# Patient Record
Sex: MALE | Race: ASIAN
[De-identification: names, ages, dates, MRNs, and addresses within clinical notes are randomized per-mention and may not be internally consistent; named-entity substitution may affect disease eponyms.]

---

## 2021-06-02 PROBLEM — Z00.00 ENCOUNTER FOR PREVENTIVE HEALTH EXAMINATION: Status: ACTIVE | Noted: 2021-06-02

## 2021-06-07 ENCOUNTER — APPOINTMENT (OUTPATIENT)
Dept: CARDIOLOGY | Facility: CLINIC | Age: 66
End: 2021-06-07
Payer: MEDICAID

## 2021-06-07 VITALS
WEIGHT: 187.9 LBS | RESPIRATION RATE: 18 BRPM | HEIGHT: 66.93 IN | OXYGEN SATURATION: 97 % | BODY MASS INDEX: 29.49 KG/M2 | DIASTOLIC BLOOD PRESSURE: 88 MMHG | TEMPERATURE: 97.6 F | HEART RATE: 81 BPM | SYSTOLIC BLOOD PRESSURE: 143 MMHG

## 2021-06-07 DIAGNOSIS — I10 ESSENTIAL (PRIMARY) HYPERTENSION: ICD-10-CM

## 2021-06-07 DIAGNOSIS — I63.9 CEREBRAL INFARCTION, UNSPECIFIED: ICD-10-CM

## 2021-06-07 DIAGNOSIS — Z82.49 FAMILY HISTORY OF ISCHEMIC HEART DISEASE AND OTHER DISEASES OF THE CIRCULATORY SYSTEM: ICD-10-CM

## 2021-06-07 DIAGNOSIS — Z87.891 PERSONAL HISTORY OF NICOTINE DEPENDENCE: ICD-10-CM

## 2021-06-07 PROCEDURE — 99204 OFFICE O/P NEW MOD 45 MIN: CPT

## 2021-06-07 PROCEDURE — 93000 ELECTROCARDIOGRAM COMPLETE: CPT

## 2021-06-07 RX ORDER — ATORVASTATIN CALCIUM 20 MG/1
20 TABLET, FILM COATED ORAL
Refills: 0 | Status: ACTIVE | COMMUNITY

## 2021-06-07 RX ORDER — VALSARTAN 160 MG/1
160 TABLET ORAL
Refills: 0 | Status: ACTIVE | COMMUNITY
Start: 2021-06-07

## 2021-06-07 RX ORDER — FOLIC ACID 1 MG/1
1 TABLET ORAL
Refills: 0 | Status: ACTIVE | COMMUNITY

## 2021-06-07 RX ORDER — ASPIRIN 81 MG
81 TABLET, DELAYED RELEASE (ENTERIC COATED) ORAL
Refills: 0 | Status: ACTIVE | COMMUNITY

## 2021-06-07 NOTE — PHYSICAL EXAM
[Well Developed] : well developed [Well Nourished] : well nourished [No Acute Distress] : no acute distress [Normal Conjunctiva] : normal conjunctiva [Normal Venous Pressure] : normal venous pressure [No Carotid Bruit] : no carotid bruit [Normal S1, S2] : normal S1, S2 [No Rub] : no rub [No Gallop] : no gallop [Clear Lung Fields] : clear lung fields [Good Air Entry] : good air entry [No Respiratory Distress] : no respiratory distress  [Soft] : abdomen soft [Non Tender] : non-tender [No Masses/organomegaly] : no masses/organomegaly [Normal Bowel Sounds] : normal bowel sounds [Normal Gait] : normal gait [No Edema] : no edema [No Cyanosis] : no cyanosis [No Clubbing] : no clubbing [No Varicosities] : no varicosities [No Rash] : no rash [No Skin Lesions] : no skin lesions [Moves all extremities] : moves all extremities [No Focal Deficits] : no focal deficits [Normal Speech] : normal speech [Alert and Oriented] : alert and oriented [Normal memory] : normal memory [de-identified] : 2/6 ZACHARY MERLOS

## 2021-06-07 NOTE — HISTORY OF PRESENT ILLNESS
[FreeTextEntry1] : 1. HTN: on medications.\par 2. Hyperlipidemia: on statin.\par 3. CP/ SOB: patient was recently admitted from 4/28 to 5/12/2021 with CVA and respiratory failure at [facility] due to\par COVID.\par Patient has noted severe exertional SOB over the last several weeks. His symptoms are exertional, progressive and worsening over the last few weeks. He also has intermittent CP without radiation.\par His ET is severely limited. \par

## 2021-06-07 NOTE — REASON FOR VISIT
[Hyperlipidemia] : hyperlipidemia [Hypertension] : hypertension [FreeTextEntry1] : 65 M HTN hyperlipidemia recent CVA with CP and SOB.

## 2021-06-07 NOTE — DISCUSSION/SUMMARY
[FreeTextEntry1] : 65 M HTN hyperlipidemia CVA with CP and SOB.\par CHECK NST TO ASSESS PERFUSION (limited ET, recent CVA).\par Continue medications for HTN and hyperlipidemia.\par [facility] echo and records reviewed.\par Continue ASA.

## 2021-06-30 ENCOUNTER — APPOINTMENT (OUTPATIENT)
Dept: CARDIOLOGY | Facility: CLINIC | Age: 66
End: 2021-06-30
Payer: MEDICAID

## 2021-06-30 DIAGNOSIS — R06.02 SHORTNESS OF BREATH: ICD-10-CM

## 2021-06-30 PROCEDURE — 93015 CV STRESS TEST SUPVJ I&R: CPT

## 2021-06-30 PROCEDURE — A9500: CPT

## 2021-06-30 PROCEDURE — 78452 HT MUSCLE IMAGE SPECT MULT: CPT

## 2025-04-18 ENCOUNTER — INPATIENT (INPATIENT)
Facility: HOSPITAL | Age: 70
LOS: 2 days | Discharge: HOME | DRG: 310 | End: 2025-04-21
Attending: INTERNAL MEDICINE | Admitting: INTERNAL MEDICINE
Payer: COMMERCIAL

## 2025-04-18 VITALS
TEMPERATURE: 98 F | RESPIRATION RATE: 18 BRPM | DIASTOLIC BLOOD PRESSURE: 89 MMHG | HEART RATE: 56 BPM | WEIGHT: 195.11 LBS | HEIGHT: 68 IN | SYSTOLIC BLOOD PRESSURE: 162 MMHG | OXYGEN SATURATION: 98 %

## 2025-04-18 DIAGNOSIS — R00.1 BRADYCARDIA, UNSPECIFIED: ICD-10-CM

## 2025-04-18 DIAGNOSIS — B36.9 SUPERFICIAL MYCOSIS, UNSPECIFIED: ICD-10-CM

## 2025-04-18 LAB
ADD ON TEST-SPECIMEN IN LAB: SIGNIFICANT CHANGE UP
ALBUMIN SERPL ELPH-MCNC: 4.2 G/DL — SIGNIFICANT CHANGE UP (ref 3.3–5)
ALP SERPL-CCNC: 126 U/L — HIGH (ref 40–120)
ALT FLD-CCNC: 10 U/L — SIGNIFICANT CHANGE UP (ref 10–45)
ANION GAP SERPL CALC-SCNC: 13 MMOL/L — SIGNIFICANT CHANGE UP (ref 5–17)
AST SERPL-CCNC: 26 U/L — SIGNIFICANT CHANGE UP (ref 10–40)
BASOPHILS # BLD AUTO: 0.06 K/UL — SIGNIFICANT CHANGE UP (ref 0–0.2)
BASOPHILS NFR BLD AUTO: 0.7 % — SIGNIFICANT CHANGE UP (ref 0–2)
BILIRUB SERPL-MCNC: 0.6 MG/DL — SIGNIFICANT CHANGE UP (ref 0.2–1.2)
BUN SERPL-MCNC: 16 MG/DL — SIGNIFICANT CHANGE UP (ref 7–23)
CALCIUM SERPL-MCNC: 9.2 MG/DL — SIGNIFICANT CHANGE UP (ref 8.4–10.5)
CHLORIDE SERPL-SCNC: 107 MMOL/L — SIGNIFICANT CHANGE UP (ref 96–108)
CO2 SERPL-SCNC: 21 MMOL/L — LOW (ref 22–31)
CREAT SERPL-MCNC: 1.31 MG/DL — HIGH (ref 0.5–1.3)
EGFR: 60 ML/MIN/1.73M2 — SIGNIFICANT CHANGE UP
EGFR: 60 ML/MIN/1.73M2 — SIGNIFICANT CHANGE UP
EOSINOPHIL # BLD AUTO: 0.48 K/UL — SIGNIFICANT CHANGE UP (ref 0–0.5)
EOSINOPHIL NFR BLD AUTO: 5.3 % — SIGNIFICANT CHANGE UP (ref 0–6)
GLUCOSE BLDC GLUCOMTR-MCNC: 108 MG/DL — HIGH (ref 70–99)
GLUCOSE BLDC GLUCOMTR-MCNC: 137 MG/DL — HIGH (ref 70–99)
GLUCOSE BLDC GLUCOMTR-MCNC: 158 MG/DL — HIGH (ref 70–99)
GLUCOSE SERPL-MCNC: 125 MG/DL — HIGH (ref 70–99)
GRAM STN FLD: SIGNIFICANT CHANGE UP
HCT VFR BLD CALC: 46.5 % — SIGNIFICANT CHANGE UP (ref 39–50)
HGB BLD-MCNC: 15.9 G/DL — SIGNIFICANT CHANGE UP (ref 13–17)
IMM GRANULOCYTES NFR BLD AUTO: 0.4 % — SIGNIFICANT CHANGE UP (ref 0–0.9)
LYMPHOCYTES # BLD AUTO: 2.37 K/UL — SIGNIFICANT CHANGE UP (ref 1–3.3)
LYMPHOCYTES # BLD AUTO: 26.1 % — SIGNIFICANT CHANGE UP (ref 13–44)
MCHC RBC-ENTMCNC: 30.4 PG — SIGNIFICANT CHANGE UP (ref 27–34)
MCHC RBC-ENTMCNC: 34.2 G/DL — SIGNIFICANT CHANGE UP (ref 32–36)
MCV RBC AUTO: 88.9 FL — SIGNIFICANT CHANGE UP (ref 80–100)
MONOCYTES # BLD AUTO: 0.62 K/UL — SIGNIFICANT CHANGE UP (ref 0–0.9)
MONOCYTES NFR BLD AUTO: 6.8 % — SIGNIFICANT CHANGE UP (ref 2–14)
NEUTROPHILS # BLD AUTO: 5.51 K/UL — SIGNIFICANT CHANGE UP (ref 1.8–7.4)
NEUTROPHILS NFR BLD AUTO: 60.7 % — SIGNIFICANT CHANGE UP (ref 43–77)
NRBC BLD AUTO-RTO: 0 /100 WBCS — SIGNIFICANT CHANGE UP (ref 0–0)
PLATELET # BLD AUTO: 168 K/UL — SIGNIFICANT CHANGE UP (ref 150–400)
POTASSIUM SERPL-MCNC: 3.9 MMOL/L — SIGNIFICANT CHANGE UP (ref 3.5–5.3)
POTASSIUM SERPL-SCNC: 3.9 MMOL/L — SIGNIFICANT CHANGE UP (ref 3.5–5.3)
PROT SERPL-MCNC: 7.5 G/DL — SIGNIFICANT CHANGE UP (ref 6–8.3)
RBC # BLD: 5.23 M/UL — SIGNIFICANT CHANGE UP (ref 4.2–5.8)
RBC # FLD: 13.1 % — SIGNIFICANT CHANGE UP (ref 10.3–14.5)
SODIUM SERPL-SCNC: 141 MMOL/L — SIGNIFICANT CHANGE UP (ref 135–145)
SPECIMEN SOURCE: SIGNIFICANT CHANGE UP
TROPONIN T, HIGH SENSITIVITY RESULT: 18 NG/L — SIGNIFICANT CHANGE UP (ref 0–51)
TSH SERPL-MCNC: 3.8 UIU/ML — SIGNIFICANT CHANGE UP (ref 0.27–4.2)
WBC # BLD: 9.08 K/UL — SIGNIFICANT CHANGE UP (ref 3.8–10.5)
WBC # FLD AUTO: 9.08 K/UL — SIGNIFICANT CHANGE UP (ref 3.8–10.5)

## 2025-04-18 PROCEDURE — 69200 CLEAR OUTER EAR CANAL: CPT | Mod: RT

## 2025-04-18 PROCEDURE — 70481 CT ORBIT/EAR/FOSSA W/DYE: CPT | Mod: 26,XU

## 2025-04-18 PROCEDURE — 71045 X-RAY EXAM CHEST 1 VIEW: CPT | Mod: 26

## 2025-04-18 PROCEDURE — 93010 ELECTROCARDIOGRAM REPORT: CPT

## 2025-04-18 PROCEDURE — 99285 EMERGENCY DEPT VISIT HI MDM: CPT | Mod: 25

## 2025-04-18 PROCEDURE — G0545: CPT

## 2025-04-18 PROCEDURE — 99222 1ST HOSP IP/OBS MODERATE 55: CPT | Mod: 25

## 2025-04-18 PROCEDURE — 70450 CT HEAD/BRAIN W/O DYE: CPT | Mod: 26

## 2025-04-18 PROCEDURE — 99222 1ST HOSP IP/OBS MODERATE 55: CPT

## 2025-04-18 RX ORDER — POSACONAZOLE 100 MG/1
300 TABLET, DELAYED RELEASE ORAL EVERY 12 HOURS
Refills: 0 | Status: COMPLETED | OUTPATIENT
Start: 2025-04-18 | End: 2025-04-19

## 2025-04-18 RX ORDER — POSACONAZOLE 100 MG/1
300 TABLET, DELAYED RELEASE ORAL EVERY 24 HOURS
Refills: 0 | Status: DISCONTINUED | OUTPATIENT
Start: 2025-04-20 | End: 2025-04-21

## 2025-04-18 RX ORDER — METFORMIN HYDROCHLORIDE 850 MG/1
1 TABLET ORAL
Refills: 0 | DISCHARGE

## 2025-04-18 RX ORDER — ACETIC ACID
5 SOLUTION, NON-ORAL VAGINAL EVERY 12 HOURS
Refills: 0 | Status: DISCONTINUED | OUTPATIENT
Start: 2025-04-18 | End: 2025-04-18

## 2025-04-18 RX ORDER — ACETIC ACID 2 %
5 SOLUTION, NON-ORAL OTIC (EAR)
Refills: 0 | Status: DISCONTINUED | OUTPATIENT
Start: 2025-04-18 | End: 2025-04-21

## 2025-04-18 RX ORDER — ACETAMINOPHEN 500 MG/5ML
1000 LIQUID (ML) ORAL ONCE
Refills: 0 | Status: COMPLETED | OUTPATIENT
Start: 2025-04-18 | End: 2025-04-18

## 2025-04-18 RX ORDER — VORICONAZOLE 200 MG/1
200 TABLET, FILM COATED ORAL EVERY 12 HOURS
Refills: 0 | Status: DISCONTINUED | OUTPATIENT
Start: 2025-04-18 | End: 2025-04-18

## 2025-04-18 RX ORDER — METOCLOPRAMIDE HCL 10 MG
10 TABLET ORAL ONCE
Refills: 0 | Status: COMPLETED | OUTPATIENT
Start: 2025-04-18 | End: 2025-04-18

## 2025-04-18 RX ORDER — POSACONAZOLE 100 MG/1
TABLET, DELAYED RELEASE ORAL
Refills: 0 | Status: DISCONTINUED | OUTPATIENT
Start: 2025-04-18 | End: 2025-04-21

## 2025-04-18 RX ORDER — ATORVASTATIN CALCIUM 80 MG/1
1 TABLET, FILM COATED ORAL
Refills: 0 | DISCHARGE

## 2025-04-18 RX ORDER — AMLODIPINE BESYLATE 10 MG/1
1 TABLET ORAL
Refills: 0 | DISCHARGE

## 2025-04-18 RX ORDER — ASPIRIN 325 MG
1 TABLET ORAL
Refills: 0 | DISCHARGE

## 2025-04-18 RX ORDER — CEFEPIME 2 G/20ML
2000 INJECTION, POWDER, FOR SOLUTION INTRAVENOUS ONCE
Refills: 0 | Status: COMPLETED | OUTPATIENT
Start: 2025-04-18 | End: 2025-04-18

## 2025-04-18 RX ORDER — CEFEPIME 2 G/20ML
INJECTION, POWDER, FOR SOLUTION INTRAVENOUS
Refills: 0 | Status: DISCONTINUED | OUTPATIENT
Start: 2025-04-18 | End: 2025-04-21

## 2025-04-18 RX ORDER — INFLUENZA A VIRUS A/IDAHO/07/2018 (H1N1) ANTIGEN (MDCK CELL DERIVED, PROPIOLACTONE INACTIVATED, INFLUENZA A VIRUS A/INDIANA/08/2018 (H3N2) ANTIGEN (MDCK CELL DERIVED, PROPIOLACTONE INACTIVATED), INFLUENZA B VIRUS B/SINGAPORE/INFTT-16-0610/2016 ANTIGEN (MDCK CELL DERIVED, PROPIOLACTONE INACTIVATED), INFLUENZA B VIRUS B/IOWA/06/2017 ANTIGEN (MDCK CELL DERIVED, PROPIOLACTONE INACTIVATED) 15; 15; 15; 15 UG/.5ML; UG/.5ML; UG/.5ML; UG/.5ML
0.5 INJECTION, SUSPENSION INTRAMUSCULAR ONCE
Refills: 0 | Status: DISCONTINUED | OUTPATIENT
Start: 2025-04-18 | End: 2025-04-21

## 2025-04-18 RX ORDER — CLOPIDOGREL BISULFATE 75 MG/1
1 TABLET, FILM COATED ORAL
Refills: 0 | DISCHARGE

## 2025-04-18 RX ORDER — CEFEPIME 2 G/20ML
2000 INJECTION, POWDER, FOR SOLUTION INTRAVENOUS EVERY 12 HOURS
Refills: 0 | Status: DISCONTINUED | OUTPATIENT
Start: 2025-04-19 | End: 2025-04-21

## 2025-04-18 RX ORDER — FOLIC ACID 1 MG/1
1 TABLET ORAL
Refills: 0 | DISCHARGE

## 2025-04-18 RX ORDER — TENOFOVIR DISOPROXIL FUMARATE 300 MG/1
1 TABLET, FILM COATED ORAL
Refills: 0 | DISCHARGE

## 2025-04-18 RX ORDER — CIPROFLOXACIN HCL 250 MG
400 TABLET ORAL EVERY 12 HOURS
Refills: 0 | Status: DISCONTINUED | OUTPATIENT
Start: 2025-04-18 | End: 2025-04-18

## 2025-04-18 RX ADMIN — CEFEPIME 100 MILLIGRAM(S): 2 INJECTION, POWDER, FOR SOLUTION INTRAVENOUS at 16:27

## 2025-04-18 RX ADMIN — Medication 10 MILLIGRAM(S): at 02:27

## 2025-04-18 RX ADMIN — Medication 4 DROP(S): at 05:58

## 2025-04-18 RX ADMIN — Medication 400 MILLIGRAM(S): at 06:27

## 2025-04-18 RX ADMIN — Medication 5 DROP(S): at 17:33

## 2025-04-18 RX ADMIN — POSACONAZOLE 300 MILLIGRAM(S): 100 TABLET, DELAYED RELEASE ORAL at 17:34

## 2025-04-18 RX ADMIN — Medication 1000 MILLIGRAM(S): at 06:50

## 2025-04-18 NOTE — PHARMACOTHERAPY INTERVENTION NOTE - COMMENTS
Performed medication reconciliation and home medication list updated in prescription writer/outpatient medication review. Medications verified with patient's wife at bedside and pharmacy.     Home Pharmacy: [facility]  Allergies: None, confirmed    Home Medications:  amLODIPine 5 mg oral tablet: 1 tab(s) orally once a day  aspirin 81 mg oral delayed release tablet: 1 tab(s) orally once a day  atorvastatin 20 mg oral tablet: 1 tab(s) orally once a day (at bedtime)  clopidogrel 75 mg oral tablet: 1 tab(s) orally once a day  folic acid 1 mg oral tablet: 1 tab(s) orally once a day  metFORMIN 500 mg oral tablet, extended release: 1 tab(s) orally 2 times a day  tenofovir disoproxil fumarate 300 mg oral tablet: 1 tab(s) orally once a day  valsartan 160 mg oral tablet: 1 tab(s) orally once a day    Micah (Dutch Santo) Duran - PharmD, BCPS  Transitions of Care Pharmacist  Available on [employer] Teams (Preferred)  Performed medication reconciliation and home medication list updated in prescription writer/outpatient medication review. Medications verified with patient's wife at bedside and pharmacy.     Home Pharmacy: [facility]  Allergies: None, confirmed    Home Medications:  amLODIPine 5 mg oral tablet: 1 tab(s) orally once a day  aspirin 81 mg oral delayed release tablet: 1 tab(s) orally once a day  atorvastatin 20 mg oral tablet: 1 tab(s) orally once a day (at bedtime)  clopidogrel 75 mg oral tablet: 1 tab(s) orally once a day  folic acid 1 mg oral tablet: 1 tab(s) orally once a day  metFORMIN 500 mg oral tablet, extended release: 1 tab(s) orally once a day  tenofovir disoproxil fumarate 300 mg oral tablet: 1 tab(s) orally once a day  valsartan 160 mg oral tablet: 1 tab(s) orally once a day    Micah (Dutch Santo) Duran - PharmD, BCPS  Transitions of Care Pharmacist  Available on [employer] Teams (Preferred)

## 2025-04-18 NOTE — ED PROVIDER NOTE - ATTENDING CONTRIBUTION TO CARE
Attending MD Le:  I have seen and examined this patient and fully participated in the care of this patient as the teaching attending. I personally made/approved the management plan and take responsibility for the patient management.    Patient presents to the emergency department with right-sided headache that began approximately 6 hours prior to arrival. The headache developed gradually and is described as severe. Patient reports the pain is located on the right side of the head. Patient also reports right ear pain that began 2 days ago. Patient denies nausea, vomiting, fever, or chills. Patient reports the right ear feels "numb" and "blocked." Patient took Tylenol approximately 3-4 hours prior to arrival (around 10:00) for pain relief but continued to have symptoms. Patient was unable to sleep due to the pain. Patient denies any prior episodes of similar headache. Patient denies any new changes in vision or new numbness/tingling in extremities.    Past Medical History (may not be comprehensive): Stroke (after first COVID vaccine), Hepatitis (unspecified type)    Allergies: No known medication allergies    Medication History (may not be comprehensive): Valsartan, Amlodipine, Folic acid, Tenofovir (for hepatitis/liver), Clopidogrel    Social History: Not provided    Review of Systems:  - Constitutional symptoms: Denies fever, chills  - Eyes: Denies changes in vision  - Ears, Nose, Mouth, Throat: Right ear pain for 2 days, sensation of ear being blocked  - Cardiovascular: Not provided  - Respiratory: Not provided  - Gastrointestinal: Denies nausea, vomiting  - Neurological: Right-sided headache for 6 hours, history of stroke with previous right-sided weakness, denies new numbness or tingling in extremities    Patient's vital signs are nonactionable.  He is sitting in the stretcher in no apparent acute distress.  Moves all extremities spontaneously. Symmetric  strength bilateral upper extremities, elbow flexion 5/5 bilaterally, elbow extension 5/5 bilaterally, patient can straight leg raise bilaterally and resist symmetrically. Sensation grossly intact to light tough in bilateral face, arms and legs. Symmetric smile, EOMI b/l. PERRL b/l.  No temporal tenderness bilaterally.  Right external ear is without obvious swelling, no mastoid tenderness on the right, no pain with manipulation of the pinna or tragus.  Right tympanic membrane is unable to be visualized due to cerumen impaction.  Left panic membrane is visualized and does not show any bulging or erythema.    Patient is presenting for gradual onset right-sided headache pain for about 6 hours also in the setting of 2 days of right ear pain.  Patient is a history of CVA in the past, grossly nonfocal neurologic exam, right ear tympanic membrane is unable to be visualized however no evidence clinically of otitis externa or mastoiditis.  Given age and history of CVA in the past will obtain CT head to assess for IPH or mass lesion, overall fairly low suspicion for both however given reassuring neurologic exam.    *The above represents an initial assessment/impression. Please refer to progress notes for potential changes in patient clinical course*

## 2025-04-18 NOTE — ED PROVIDER NOTE - PHYSICAL EXAMINATION
Const: Awake, alert, no acute distress.  Appears well.  Moving comfortably on stretcher.  HEENT: NC/AT.  Moist mucous membranes. No R external ear TTP, no pain with tugging of ear, no swelling posterior to the ear, no discharge noted. L ear wnl.   Eyes: Extraocular movements intact b/l.  PERRL. No scleral icterus.  Neck: Full ROM without pain. Supple.  Cardiac: Regular rate and regular rhythm. S1 S2 present.   Resp: No evidence of respiratory distress.  No stridor or wheeze.  Good air entry b/l, breath sounds clear to auscultation b/l.  Abd: Non-distended. Soft, nontender  Skin: Normal coloration.  No rashes, abrasions or lacerations.  Neuro: Awake, alert & oriented x 3.  Moves all extremities symmetrically.  No obvious focal deficits. 5/5 strength to b/l UE and LEs (from hip), sensation intact throughout. No facial asymmetry. No change in speech per wife.

## 2025-04-18 NOTE — ED PROVIDER NOTE - CLINICAL SUMMARY MEDICAL DECISION MAKING FREE TEXT BOX
69-year-old male with PMHx HTN, HLD, prediabetes, right sided stroke presenting with right ear pain, R sided headache x 2 days. Symptoms started after pts wife tried to remove wax from R ear with a "wax removing stick." VS: ana paula, HTN. PE: benign  DDx includes but is not limited to ICH, migraine, pain 2/2 trauma to ear, otitis media  Work up: basic labs, CT head, EKG   Tx: reglan  Plan: if work up wnl and pt remains stable will dc for neurology f/u 69-year-old male with PMHx HTN, HLD, prediabetes, right sided stroke presenting with right ear pain, R sided headache x 2 days. Symptoms started after pts wife tried to remove wax from R ear with a "wax removing stick." VS: ana paula, HTN. PE: benign  DDx includes but is not limited to ICH, migraine, pain 2/2 trauma to ear, otitis media/externa  Work up: basic labs, CT head, EKG   Tx: reglan  Plan: if work up wnl and pt remains stable will dc for neurology f/u

## 2025-04-18 NOTE — H&P ADULT - HISTORY OF PRESENT ILLNESS
69-year-old male with PMHx HTN, HLD, prediabetes, right sided stroke presenting with right ear pain, headache x 2 days.  69-year-old male with pmhx htn, hld, prediabetes, right sided stroke presenting with right ear pain, headache x 2 days.    Patient's wife used a "wax removing stick" to clean wax out of patient's right ear on 4/15/2025.  Noticed some scant bloody discharge during the cleaning.  Later that night patient had ear pain.  Ear pain resolved by the next day but patient started experiencing headache which persisted into today. R sided, slow in onset, severe. Has tried Tylenol, Motrin, NyQuil without significant relief.  Had difficulty sleeping last night 2/2 HA.  Denies any further discharge from right ear.  Denies fever, CP, SOB, abd pain, n/v, d/c, numbness, tingling, paresthesia, weakness. No falls or trauma.

## 2025-04-18 NOTE — ED PROVIDER NOTE - OBJECTIVE STATEMENT
69-year-old male with PMHx HTN, HLD, prediabetes, right sided stroke presenting with right ear pain, headache x 2 days.  Patient's wife used a "wax removing stick" to clean wax out of patient's right ear on 4/15/2025.  Noticed some scant bloody discharge during the cleaning.  Later that night patient had ear pain.  Ear pain resolved by the next day but patient started experiencing headache which persisted into today. R sided, slow in onset, severe. Has tried Tylenol, Motrin, NyQuil without significant relief.  Had difficulty sleeping last night 2/2 HA.  Denies any further discharge from right ear.  Denies fever, CP, SOB, abd pain, n/v, d/c, numbness, tingling, paresthesia, weakness. No falls or trauma.

## 2025-04-18 NOTE — CONSULT NOTE ADULT - ASSESSMENT
69-year-old male with PMHx HTN, HLD, prediabetes, right sided stroke presenting with right ear pain, headache x 2 days.  Patient's wife used a "wax removing stick" to clean wax out of patient's right ear on 4/15/2025.  Noticed some scant bloody discharge during the cleaning followed by pain. On exam, wick removed from right EAC, ear canal w/ white and black debris which was suctioned and fungal / bacterial cultures obtained, unable to fully visualize TM.

## 2025-04-18 NOTE — ED PROVIDER NOTE - NSFOLLOWUPINSTRUCTIONS_ED_ALL_ED_FT
You were seen in the ED for ear pain and headache. You had blood work and imaging done here today. The results are attached within this packet, please bring it with you when you follow up with your PCP and/ neurologist in the next 2-3 days.  If you have issues obtaining follow up, please call: [phone] to obtain a doctor or specialist who takes your insurance in your area.    A migraine headache is a very strong throbbing pain on one side or both sides of your head. This type of headache can also cause other symptoms. It can last from 4 hours to 3 days. Talk with your doctor about what things may bring on (trigger) this condition.    What are the causes?  The exact cause of this condition is not known. This condition may be triggered or caused by:  •Drinking alcohol.  •Smoking.    •Taking medicines, such as:  •Medicine used to treat chest pain (nitroglycerin).  •Birth control pills.  •Estrogen.  •Some blood pressure medicines.  •Eating or drinking certain products.  •Doing physical activity.    Other things that may trigger a migraine headache include:  •Having a menstrual period.  •Pregnancy.  •Hunger.  •Stress.  •Not getting enough sleep or getting too much sleep  •Weather changes.  •Tiredness (fatigue).      What increases the risk?  •Being 25–55 years old.  •Being female.  •Having a family history of migraine headaches.  •Being .  •Having depression or anxiety.  •Being very overweight.    What are the signs or symptoms?  •A throbbing pain. This pain may:  •Happen in any area of the head, such as on one side or both sides.  •Make it hard to do daily activities.  •Get worse with physical activity.  •Get worse around bright lights or loud noises.  •Other symptoms may include:  •Feeling sick to your stomach (nauseous).  •Vomiting.  •Dizziness.  •Being sensitive to bright lights, loud noises, or smells.  •Before you get a migraine headache, you may get warning signs (an aura). An aura may include:  •Seeing flashing lights or having blind spots.  •Seeing bright spots, halos, or zigzag lines.  •Having tunnel vision or blurred vision.  •Having numbness or a tingling feeling.  •Having trouble talking.  •Having weak muscles.  •Some people have symptoms after a migraine headache (postdromal phase), such as:  •Tiredness.  •Trouble thinking (concentrating).    How is this treated?  •Taking medicines that:  •Relieve pain.  •Relieve the feeling of being sick to your stomach.  •Prevent migraine headaches.  •Treatment may also include:  •Having acupuncture.  •Avoiding foods that bring on migraine headaches.  •Learning ways to control your body functions (biofeedback).  •Therapy to help you know and deal with negative thoughts (cognitive behavioral therapy).    Follow these instructions at home:    Medicines   •Take over-the-counter and prescription medicines only as told by your doctor.  •Ask your doctor if the medicine prescribed to you:  •Requires you to avoid driving or using heavy machinery.  •Can cause trouble pooping (constipation). You may need to take these steps to prevent or treat trouble pooping:  •Drink enough fluid to keep your pee (urine) pale yellow.  •Take over-the-counter or prescription medicines.  •Eat foods that are high in fiber. These include beans, whole grains, and fresh fruits and vegetables.  •Limit foods that are high in fat and sugar. These include fried or sweet foods.    Lifestyle   • Do not drink alcohol.  • Do not use any products that contain nicotine or tobacco, such as cigarettes, e-cigarettes, and chewing tobacco. If you need help quitting, ask your doctor.  •Get at least 8 hours of sleep every night.  •Limit and deal with stress.    General instructions     •Keep a journal to find out what may bring on your migraine headaches. For example, write down:  •What you eat and drink.  •How much sleep you get.  •Any change in what you eat or drink.  •Any change in your medicines.  •If you have a migraine headache:  •Avoid things that make your symptoms worse, such as bright lights.  •It may help to lie down in a dark, quiet room.  •Do not drive or use heavy machinery.  •Ask your doctor what activities are safe for you.  •Keep all follow-up visits as told by your doctor. This is important.    Contact a doctor if:  •You get a migraine headache that is different or worse than others you have had  •You have more than 15 headache days in one month.    Get help right away if:  •Your migraine headache gets very bad.  •Your migraine headache lasts longer than 72 hours.  •You have a fever.  •You have a stiff neck.  •You have trouble seeing  •Your muscles feel weak or like you cannot control them.  •You start to lose your balance a lot.  •You start to have trouble walking.  •You pass out (faint).  •You have a seizure.    Summary  •A migraine headache is a very strong throbbing pain on one side or both sides of your head. These headaches can also cause other symptoms.  •This condition may be treated with medicines and changes to your lifestyle.  •Keep a journal to find out what may bring on your migraine headaches.  •Contact a doctor if you get a migraine headache that is different or worse than others you have had.  •Contact your doctor if you have more than 15 headache days in a month.    This information is not intended to replace advice given to you by your health care provider. Make sure you discuss any questions you have with your health care provider.

## 2025-04-18 NOTE — H&P ADULT - ASSESSMENT
69-year-old male with pmhx htn, hld,  prediabetes, right sided stroke presenting with right ear pain, headache x 2 days    IV Cipro    ID odalis called   ENT consult appreciated   Follow up Cx     HTN   HLD     Home meds     Pre Diabetes     HISS  69-year-old male with pmhx htn, hld,  prediabetes, right sided stroke presenting with right ear pain, headache x 2 days      CT Head shows  No acute intracranial hemorrhage, mass effect, or midline shift. Mild soft tissue thickening along the right external auditory canal, question otitis externa,     Rt Ear Pain Right - wick removed, ear canal w/ white and black debris which was suctioned, fungal and bacterial cultures obtained, unable to fully visualize TM. No mastoid tenderness, erythema, or ear bulging    ID odalis appreciated   ENT consult following   * switch cipro to cefepime 2 q 12  * start posaconazole 300 bid for 2 doses then qd for now as per ID       HTN   HLD     Home meds     Pre Diabetes     HISS

## 2025-04-18 NOTE — CONSULT NOTE ADULT - SUBJECTIVE AND OBJECTIVE BOX
HPI:  69m with HTN, HLD, CVA, has had ear itching and foreign body sensation for a while and has been using Q-tips, 2-3 days ago, his wife used a wax cleaning stick and noticed some scant bloody discharge during the cleaning, after that he started to have R ear and head pain, no fever, chills, neck pain, vomiting  here afebrile, no WBC  head CT: No acute intracranial hemorrhage, mass effect, or midline shift. Mild soft tissue thickening along the right external auditory canal, question otitis externa, correlate clinically.  On ENT exam, wick removed from right EAC, ear canal w/ white and black debris which was suctioned and fungal / bacterial cultures obtained, unable to fully visualize TM.        PAST MEDICAL & SURGICAL HISTORY:      Allergies    No Known Allergies    Intolerances        ANTIMICROBIALS:  ciprofloxacin   IVPB 400 every 12 hours      OTHER MEDS:  influenza  Vaccine (HIGH DOSE) 0.5 milliLiter(s) IntraMuscular once      SOCIAL HISTORY:  from [place], , lives with wife, no pets at home  no smoking, alcohol or drug abuse  no recent travel    FAMILY HISTORY:  no recent febrile illness in family members    ROS:    All other systems negative     Constitutional: no fever, no chills  Eye: no eye pain, no redness, no vision changes  ENT: R ear pain and R headache pain  Cardiovascular:  no chest pain, no palpitation  Respiratory:  no SOB, no cough  GI:  no abd pain, no vomiting, no diarrhea  urinary: no dysuria, no hematuria, no flank pain  : no scrotal pain  musculoskeletal:  no joint pain, no joint swelling  skin:  no rash  neurology:  no headache, no seizure  psych: no anxiety, no depression     Physical Exam:    General:    NAD, non toxic  Head: atraumatic, normocephalic  Eyes: normal sclera and conjunctiva  ENT:   no tenderness or significant edema of the ear  Cardio:    regular   Respiratory:   comfortable on RA  abd:   soft,  not tender  :     no CVAT, no suprapubic tenderness, no martinez  Musculoskeletal : no joint swelling, no edema  Skin:    no rash  vascular: no phlebitis        Drug Dosing Weight  Height (cm): 172.7 (18 Apr 2025 12:28)  Weight (kg): 88.5 (18 Apr 2025 12:28)  BMI (kg/m2): 29.7 (18 Apr 2025 12:28)  BSA (m2): 2.02 (18 Apr 2025 12:28)    Vital Signs Last 24 Hrs  T(F): 98.2 (04-18-25 @ 11:00), Max: 98.3 (04-18-25 @ 05:35)    Vital Signs Last 24 Hrs  HR: 66 (04-18-25 @ 11:00) (47 - 66)  BP: 107/74 (04-18-25 @ 11:00) (107/74 - 162/89)  RR: 18 (04-18-25 @ 11:00)  SpO2: 96% (04-18-25 @ 11:00) (95% - 98%)  Wt(kg): --                          15.9   9.08  )-----------( 168      ( 18 Apr 2025 02:32 )             46.5       04-18    141  |  107  |  16  ----------------------------<  125[H]  3.9   |  21[L]  |  1.31[H]    Ca    9.2      18 Apr 2025 02:32    TPro  7.5  /  Alb  4.2  /  TBili  0.6  /  DBili  x   /  AST  26  /  ALT  10  /  AlkPhos  126[H]  04-18      Urinalysis Basic - ( 18 Apr 2025 02:32 )    Color: x / Appearance: x / SG: x / pH: x  Gluc: 125 mg/dL / Ketone: x  / Bili: x / Urobili: x   Blood: x / Protein: x / Nitrite: x   Leuk Esterase: x / RBC: x / WBC x   Sq Epi: x / Non Sq Epi: x / Bacteria: x        MICROBIOLOGY:  v              RADIOLOGY:    Images independently visualized and reviewed personally,  findings as below    < from: Xray Chest 1 View- PORTABLE-Urgent (Xray Chest 1 View- PORTABLE-Urgent .) (04.18.25 @ 03:40) >  IMPRESSION:  Low lung volumes.  No focal consolidation.    < end of copied text >  < from: CT Head No Cont (04.18.25 @ 02:50) >  IMPRESSION:  No acute intracranial hemorrhage, mass effect, or midline shift.    Mild soft tissue thickening along the right external auditory canal,   question otitis externa, correlate clinically.    < end of copied text >  
CC: ear pain     HPI: 69-year-old male with PMHx HTN, HLD, prediabetes, right sided stroke presenting with right ear pain, headache x 2 days.  Patient's wife used a "wax removing stick" to clean wax out of patient's right ear on 4/15/2025.  Noticed some scant bloody discharge during the cleaning.  Later that night patient had ear pain.  Ear pain resolved by the next day but patient started experiencing headache which persisted into today. R sided, slow in onset, severe. Has tried Tylenol, Motrin, NyQuil without significant relief.  Had difficulty sleeping last night 2/2 HA.  Denies any further discharge from right ear.  Denies fever, CP, SOB, abd pain, n/v, d/c, numbness, tingling, paresthesia, weakness. No falls or trauma.      PAST MEDICAL & SURGICAL HISTORY:    Allergies    No Known Allergies    Intolerances      MEDICATIONS  (STANDING):  ciprofloxacin   IVPB 400 milliGRAM(s) IV Intermittent every 12 hours  influenza  Vaccine (HIGH DOSE) 0.5 milliLiter(s) IntraMuscular once    MEDICATIONS  (PRN):      Social History: no pertinent social history     Family history: no pertinent family history     ROS:   ENT: all negative except as noted in HPI   CV: denies palpitations  Pulm: denies SOB, cough, hemoptysis  GI: denies change in appetite, indigestion, n/v  : denies pertinent urinary symptoms, urgency  Neuro: denies numbness/tingling, loss of sensation  Psych: denies anxiety  MS: denies muscle weakness, instability  Heme: denies easy bruising or bleeding  Endo: denies heat/cold intolerance, excessive sweating  Vascular: denies LE edema    Vital Signs Last 24 Hrs  T(C): 36.8 (18 Apr 2025 11:00), Max: 36.8 (18 Apr 2025 01:45)  T(F): 98.2 (18 Apr 2025 11:00), Max: 98.3 (18 Apr 2025 05:35)  HR: 66 (18 Apr 2025 11:00) (47 - 66)  BP: 107/74 (18 Apr 2025 11:00) (107/74 - 162/89)  BP(mean): --  RR: 18 (18 Apr 2025 11:00) (18 - 18)  SpO2: 96% (18 Apr 2025 11:00) (95% - 98%)    Parameters below as of 18 Apr 2025 11:00  Patient On (Oxygen Delivery Method): room air                              15.9   9.08  )-----------( 168      ( 18 Apr 2025 02:32 )             46.5    04-18    141  |  107  |  16  ----------------------------<  125[H]  3.9   |  21[L]  |  1.31[H]    Ca    9.2      18 Apr 2025 02:32    TPro  7.5  /  Alb  4.2  /  TBili  0.6  /  DBili  x   /  AST  26  /  ALT  10  /  AlkPhos  126[H]  04-18       PHYSICAL EXAM:  Gen: NAD  Skin: No rashes, bruises, or lesions  Head: Normocephalic, Atraumatic  Face: no edema, erythema, or fluctuance. Parotid glands soft without mass  Eyes: no scleral injection  Ears: Right - wick removed, ear canal w/ white and black debris which was suctioned, fungal and bacterial cultures obtained, unable to fully visualize TM. No mastoid tenderness, erythema, or ear bulging            Left - ear canal clear, TM intact without effusion or erythema. No evidence of any fluid drainage. No mastoid tenderness, erythema, or ear bulging  Nose: Nares bilaterally patent, no discharge  Mouth: No Stridor / Drooling / Trismus.  Mucosa moist, tongue/uvula midline, oropharynx clear  Neck: Flat, supple, no lymphadenopathy, trachea midline, no masses  Lymphatic: No lymphadenopathy  Resp: breathing easily, no stridor  CV: no peripheral edema/cyanosis  GI: nondistended   Peripheral vascular: no JVD or edema  Neuro: facial nerve intact, no facial droop

## 2025-04-18 NOTE — PATIENT PROFILE ADULT - FALL HARM RISK - HARM RISK INTERVENTIONS
Communicate Risk of Fall with Harm to all staff/Monitor for mental status changes/Monitor gait and stability/Reinforce activity limits and safety measures with patient and family/Review medications for side effects contributing to fall risk/Sit up slowly, dangle for a short time, stand at bedside before walking/Tailored Fall Risk Interventions/Use of alarms - bed, chair and/or voice tab/Visual Cue: Yellow wristband and red socks/Bed in lowest position, wheels locked, appropriate side rails in place/Call bell, personal items and telephone in reach/Instruct patient to call for assistance before getting out of bed or chair/Non-slip footwear when patient is out of bed/Johnson to call system/Physically safe environment - no spills, clutter or unnecessary equipment/Purposeful Proactive Rounding/Room/bathroom lighting operational, light cord in reach

## 2025-04-18 NOTE — CONSULT NOTE ADULT - ASSESSMENT
69m with HTN, HLD, CVA, has had ear itching and foreign body sensation for a while and has been using Q-tips, 2-3 days ago, his wife used a wax cleaning stick and noticed some scant bloody discharge during the cleaning, after that he started to have R ear and head pain, no fever, chills, neck pain, vomiting  here afebrile, no WBC  head CT: No acute intracranial hemorrhage, mass effect, or midline shift. Mild soft tissue thickening along the right external auditory canal, question otitis externa, correlate clinically.  On ENT exam, wick removed from right EAC, ear canal w/ white and black debris which was suctioned and fungal / bacterial cultures obtained, unable to fully visualize TM.    otitis externa, on ENT exam had ear canal w/ white and black debris which was suctioned and ENT though it is fungal otitis external  chronic hep B on tenofovir    * follow the ear cx  * switch cipro to cefepime 2 q 12  * start voriconazole 200 PO q 12 for now  * follow with ENT   * topical antifungal as per ENT  * monitor CBC/diff and CMP    The above assessment and plan was discussed with the primary team    Manuela Delaney MD  contact on teams  After 5pm and on weekends call [phone]   69m with HTN, HLD, CVA, has had ear itching and foreign body sensation for a while and has been using Q-tips, 2-3 days ago, his wife used a wax cleaning stick and noticed some scant bloody discharge during the cleaning, after that he started to have R ear and head pain, no fever, chills, neck pain, vomiting  here afebrile, no WBC  head CT: No acute intracranial hemorrhage, mass effect, or midline shift. Mild soft tissue thickening along the right external auditory canal, question otitis externa, correlate clinically.  On ENT exam, wick removed from right EAC, ear canal w/ white and black debris which was suctioned and fungal / bacterial cultures obtained, unable to fully visualize TM.    otitis externa, on ENT exam had ear canal w/ white and black debris which was suctioned and ENT though it is fungal otitis externa, pt is not immunocompromised though  chronic hep B on tenofovir    * follow the ear cx  * switch cipro to cefepime 2 q 12  * start posaconazole 300 bid for 2 doses then qd for now  * follow with ENT   * topical antifungal as per ENT  * monitor CBC/diff and CMP    The above assessment and plan was discussed with the primary team    Manuela Delaney MD  contact on teams  After 5pm and on weekends call [phone]

## 2025-04-18 NOTE — H&P ADULT - GASTROINTESTINAL
Doing better. Rash resolving.  Mother having trouble filling Doxy RX due to insurance.  Mother to call Dr. Hernandez today for assistance or recommendation for other ABX. 07/14@1053: CMV by PCR negative. Cheryle Caputo NP soft/nontender/nondistended/normal active bowel sounds

## 2025-04-18 NOTE — CONSULT NOTE ADULT - PROBLEM SELECTOR RECOMMENDATION 9
- Recommend Acetic acid ear drops, 5 drops to right ear BID x14 days   - F/u fungal and bacterial cultures  - Patient should follow up in ENT office as an outpatient. May see Dr. Nuñez, Dr. Mcgee, Dr. Kirkland, Dr. Lerner. Call [phone].

## 2025-04-18 NOTE — ED PROVIDER NOTE - PROGRESS NOTE DETAILS
Attending MD Le: patient noted to be bradycardic during work up, Ana Paula as low as mid 30s, ecg sinus with 1st deg AVB, no priors for comparison. Patient is not having specific symptoms referable to bradycardia at this time but is not on vera blockers so this is unusual. Pending CT head, will maintain on tele, given marked 1st deg AVB and sinus ana paula, patient in my opinion warrants admission for telemetry, TTE and likely EP consultation. No indication at this time for urgent pacing therapy given patient is asymptomatic Lio: work up sig for otitis externa on R. Pt found to be ana paula. No PMHx or medication to explain this. Family hx of early cardiac death, nephew (29) and sister (47). Rec admission for cards follow up. Labs and imaging results reviewed with pt at bedside. Ear wick with abx placed in R ear. All questions answered. Pt and his wife aware of and in agreement with plan to admit for cardiac work up. Patient case discussed with hospitalist, ok to admit.  Patient is stable and ready for admission.

## 2025-04-18 NOTE — ED ADULT NURSE NOTE - OBJECTIVE STATEMENT
70 y/o M A&Ox4 with PMH of stroke presents to the ED c/o ear pain. Pt reports L ear pain x 2 days. Pt reports HA for the past 6 hours. Denies chest pain, SOB, n/v/d, lightheadedness, dizziness, fever, chills. IV access established; 20 G L AC. Patient safety maintained, bed is in lowest position, wheels locked, and side rails raised. 68 y/o M A&Ox4 with PMH of stroke presents to the ED c/o ear pain. Pt reports R ear pain x 2 days; reports he attempted to clean out ear wax a few days ago when he noticed some "bloody" discharge afterwards. Pt reports HA for the past 6 hours; reports taking Tylenol and Motrin with no relief in pain. Denies chest pain, SOB, n/v/d, lightheadedness, dizziness, fever, chills. IV access established; 20 G L AC. Patient safety maintained, bed is in lowest position, wheels locked, and side rails raised.

## 2025-04-18 NOTE — ED PROVIDER NOTE - CARE PLAN
1 Principal Discharge DX:	Unilateral headache   Principal Discharge DX:	Unilateral headache  Secondary Diagnosis:	Bradycardia   Principal Discharge DX:	Bradycardia  Secondary Diagnosis:	Otitis externa of right ear

## 2025-04-19 LAB
ANION GAP SERPL CALC-SCNC: 11 MMOL/L — SIGNIFICANT CHANGE UP (ref 5–17)
BUN SERPL-MCNC: 17 MG/DL — SIGNIFICANT CHANGE UP (ref 7–23)
CALCIUM SERPL-MCNC: 8.7 MG/DL — SIGNIFICANT CHANGE UP (ref 8.4–10.5)
CHLORIDE SERPL-SCNC: 107 MMOL/L — SIGNIFICANT CHANGE UP (ref 96–108)
CO2 SERPL-SCNC: 21 MMOL/L — LOW (ref 22–31)
CREAT SERPL-MCNC: 1.22 MG/DL — SIGNIFICANT CHANGE UP (ref 0.5–1.3)
EGFR: 65 ML/MIN/1.73M2 — SIGNIFICANT CHANGE UP
EGFR: 65 ML/MIN/1.73M2 — SIGNIFICANT CHANGE UP
GLUCOSE BLDC GLUCOMTR-MCNC: 111 MG/DL — HIGH (ref 70–99)
GLUCOSE BLDC GLUCOMTR-MCNC: 121 MG/DL — HIGH (ref 70–99)
GLUCOSE BLDC GLUCOMTR-MCNC: 133 MG/DL — HIGH (ref 70–99)
GLUCOSE BLDC GLUCOMTR-MCNC: 185 MG/DL — HIGH (ref 70–99)
GLUCOSE SERPL-MCNC: 147 MG/DL — HIGH (ref 70–99)
HCT VFR BLD CALC: 45.3 % — SIGNIFICANT CHANGE UP (ref 39–50)
HGB BLD-MCNC: 15.8 G/DL — SIGNIFICANT CHANGE UP (ref 13–17)
MCHC RBC-ENTMCNC: 30.7 PG — SIGNIFICANT CHANGE UP (ref 27–34)
MCHC RBC-ENTMCNC: 34.9 G/DL — SIGNIFICANT CHANGE UP (ref 32–36)
MCV RBC AUTO: 88 FL — SIGNIFICANT CHANGE UP (ref 80–100)
NRBC BLD AUTO-RTO: 0 /100 WBCS — SIGNIFICANT CHANGE UP (ref 0–0)
PLATELET # BLD AUTO: 141 K/UL — LOW (ref 150–400)
POTASSIUM SERPL-MCNC: 3.7 MMOL/L — SIGNIFICANT CHANGE UP (ref 3.5–5.3)
POTASSIUM SERPL-SCNC: 3.7 MMOL/L — SIGNIFICANT CHANGE UP (ref 3.5–5.3)
RBC # BLD: 5.15 M/UL — SIGNIFICANT CHANGE UP (ref 4.2–5.8)
RBC # FLD: 13 % — SIGNIFICANT CHANGE UP (ref 10.3–14.5)
SODIUM SERPL-SCNC: 139 MMOL/L — SIGNIFICANT CHANGE UP (ref 135–145)
WBC # BLD: 6.11 K/UL — SIGNIFICANT CHANGE UP (ref 3.8–10.5)
WBC # FLD AUTO: 6.11 K/UL — SIGNIFICANT CHANGE UP (ref 3.8–10.5)

## 2025-04-19 PROCEDURE — 99231 SBSQ HOSP IP/OBS SF/LOW 25: CPT

## 2025-04-19 RX ORDER — ACETAMINOPHEN 500 MG/5ML
1000 LIQUID (ML) ORAL ONCE
Refills: 0 | Status: COMPLETED | OUTPATIENT
Start: 2025-04-19 | End: 2025-04-19

## 2025-04-19 RX ORDER — OXYCODONE HYDROCHLORIDE 30 MG/1
2.5 TABLET ORAL ONCE
Refills: 0 | Status: DISCONTINUED | OUTPATIENT
Start: 2025-04-19 | End: 2025-04-19

## 2025-04-19 RX ORDER — KETOROLAC TROMETHAMINE 30 MG/ML
15 INJECTION, SOLUTION INTRAMUSCULAR; INTRAVENOUS ONCE
Refills: 0 | Status: DISCONTINUED | OUTPATIENT
Start: 2025-04-19 | End: 2025-04-19

## 2025-04-19 RX ADMIN — Medication 1000 MILLIGRAM(S): at 09:22

## 2025-04-19 RX ADMIN — KETOROLAC TROMETHAMINE 15 MILLIGRAM(S): 30 INJECTION, SOLUTION INTRAMUSCULAR; INTRAVENOUS at 18:33

## 2025-04-19 RX ADMIN — CEFEPIME 100 MILLIGRAM(S): 2 INJECTION, POWDER, FOR SOLUTION INTRAVENOUS at 05:06

## 2025-04-19 RX ADMIN — OXYCODONE HYDROCHLORIDE 2.5 MILLIGRAM(S): 30 TABLET ORAL at 10:45

## 2025-04-19 RX ADMIN — CEFEPIME 100 MILLIGRAM(S): 2 INJECTION, POWDER, FOR SOLUTION INTRAVENOUS at 17:13

## 2025-04-19 RX ADMIN — Medication 2 MILLIGRAM(S): at 23:12

## 2025-04-19 RX ADMIN — POSACONAZOLE 300 MILLIGRAM(S): 100 TABLET, DELAYED RELEASE ORAL at 05:06

## 2025-04-19 RX ADMIN — POSACONAZOLE 300 MILLIGRAM(S): 100 TABLET, DELAYED RELEASE ORAL at 17:12

## 2025-04-19 RX ADMIN — Medication 5 DROP(S): at 17:12

## 2025-04-19 RX ADMIN — Medication 400 MILLIGRAM(S): at 08:23

## 2025-04-19 RX ADMIN — OXYCODONE HYDROCHLORIDE 2.5 MILLIGRAM(S): 30 TABLET ORAL at 09:45

## 2025-04-19 RX ADMIN — Medication 2 MILLIGRAM(S): at 11:39

## 2025-04-19 RX ADMIN — Medication 5 DROP(S): at 05:06

## 2025-04-19 RX ADMIN — Medication 2 MILLIGRAM(S): at 12:45

## 2025-04-19 RX ADMIN — KETOROLAC TROMETHAMINE 15 MILLIGRAM(S): 30 INJECTION, SOLUTION INTRAMUSCULAR; INTRAVENOUS at 19:35

## 2025-04-19 NOTE — PROGRESS NOTE ADULT - SUBJECTIVE AND OBJECTIVE BOX
ENT ISSUE/POD: R. fungal otitis externa    HPI: 69-year-old male with PMHx HTN, HLD, prediabetes, right sided stroke presenting with right ear pain, headache x 2 days.  Patient's wife used a "wax removing stick" to clean wax out of patient's right ear on 4/15/2025.  Noticed some scant bloody discharge during the cleaning.  Later that night patient had ear pain.  Ear pain resolved by the next day but patient started experiencing headache which persisted into today. R sided, slow in onset, severe. Has tried Tylenol, Motrin, NyQuil without significant relief.  Had difficulty sleeping 2/2 HA.  Pt was found to have R. fungal otitis externa and started on acetic acid. Denies any further discharge from right ear, hearing loss, tinnitus, vertigo, fevers.      PAST MEDICAL & SURGICAL HISTORY:  HTN (hypertension)      HLD (hyperlipidemia)      Pre-existing type 1 diabetes mellitus        Allergies    No Known Allergies    Intolerances      MEDICATIONS  (STANDING):  acetic acid 2% Solution 5 Drop(s) Right Ear two times a day  cefepime   IVPB 2000 milliGRAM(s) IV Intermittent every 12 hours  cefepime   IVPB      influenza  Vaccine (HIGH DOSE) 0.5 milliLiter(s) IntraMuscular once  posaconazole DR Tablet   Oral   posaconazole DR Tablet 300 milliGRAM(s) Oral every 12 hours    MEDICATIONS  (PRN):      Social History: see consult    Family history: see consult    ROS:   ENT: all negative except as noted in HPI   Pulm: denies SOB, cough, hemoptysis  Neuro: denies numbness/tingling, loss of sensation  Endo: denies heat/cold intolerance, excessive sweating      Vital Signs Last 24 Hrs  T(C): 37.1 (19 Apr 2025 11:12), Max: 37.1 (19 Apr 2025 11:12)  T(F): 98.7 (19 Apr 2025 11:12), Max: 98.7 (19 Apr 2025 11:12)  HR: 70 (19 Apr 2025 11:12) (55 - 70)  BP: 141/80 (19 Apr 2025 11:12) (123/84 - 141/80)  BP(mean): --  RR: 18 (19 Apr 2025 11:12) (17 - 18)  SpO2: 98% (19 Apr 2025 11:12) (95% - 98%)    Parameters below as of 19 Apr 2025 11:12  Patient On (Oxygen Delivery Method): room air                              15.8   6.11  )-----------( 141      ( 19 Apr 2025 05:59 )             45.3    04-19    139  |  107  |  17  ----------------------------<  147[H]  3.7   |  21[L]  |  1.22    Ca    8.7      19 Apr 2025 05:59    TPro  7.5  /  Alb  4.2  /  TBili  0.6  /  DBili  x   /  AST  26  /  ALT  10  /  AlkPhos  126[H]  04-18       PHYSICAL EXAM:  Gen: NAD  Skin: No rashes, bruises, or lesions  Head: Normocephalic, Atraumatic  Face: no edema, erythema, or fluctuance. Parotid glands soft without mass  Eyes: no scleral injection  Ears: Right - ear canal w/ white and black debris fungal, unable to fully visualize TM. No mastoid tenderness, erythema, or ear bulging            Left - ear canal clear, TM intact without effusion or erythema. No evidence of any fluid drainage. No mastoid tenderness, erythema, or ear bulging  Nose: Nares bilaterally patent, no discharge  Mouth: No Stridor / Drooling / Trismus.  Mucosa moist, tongue/uvula midline, oropharynx clear  Neck: Flat, supple, no lymphadenopathy, trachea midline, no masses  Lymphatic: No lymphadenopathy  Resp: breathing easily, no stridor  CV: no peripheral edema/cyanosis  GI: nondistended   Peripheral vascular: no JVD or edema  Neuro: facial nerve intact, no facial droop

## 2025-04-19 NOTE — PROGRESS NOTE ADULT - SUBJECTIVE AND OBJECTIVE BOX
Patient is a 69y old  Male who presents with a chief complaint of Rt Ear pain x 3 days (19 Apr 2025 10:50)      SUBJECTIVE / OVERNIGHT EVENTS: Patient reports Headache       MEDICATIONS  (STANDING):  acetic acid 2% Solution 5 Drop(s) Right Ear two times a day  cefepime   IVPB 2000 milliGRAM(s) IV Intermittent every 12 hours  cefepime   IVPB      influenza  Vaccine (HIGH DOSE) 0.5 milliLiter(s) IntraMuscular once  posaconazole DR Tablet   Oral     MEDICATIONS  (PRN):      CAPILLARY BLOOD GLUCOSE      POCT Blood Glucose.: 185 mg/dL (19 Apr 2025 17:18)  POCT Blood Glucose.: 111 mg/dL (19 Apr 2025 11:44)  POCT Blood Glucose.: 121 mg/dL (19 Apr 2025 07:50)  POCT Blood Glucose.: 158 mg/dL (18 Apr 2025 21:30)    I&O's Summary    18 Apr 2025 07:01  -  19 Apr 2025 07:00  --------------------------------------------------------  IN: 480 mL / OUT: 0 mL / NET: 480 mL    19 Apr 2025 07:01  -  19 Apr 2025 19:44  --------------------------------------------------------  IN: 480 mL / OUT: 0 mL / NET: 480 mL      T(C): 37.1 (04-19-25 @ 11:12), Max: 37.1 (04-19-25 @ 11:12)  HR: 97 (04-19-25 @ 16:23) (70 - 97)  BP: 124/85 (04-19-25 @ 16:23) (124/85 - 141/80)  RR: 18 (04-19-25 @ 11:12) (18 - 18)  SpO2: 98% (04-19-25 @ 11:12) (98% - 98%)    PHYSICAL EXAM:  GENERAL: NAD, well-developed  HEAD:  Atraumatic, Normocephalic  EYES: EOMI, PERRLA, conjunctiva and sclera clear  NECK: Supple, No JVD  CHEST/LUNG: Clear to auscultation bilaterally; No wheeze  HEART: Regular rate and rhythm; No murmurs, rubs, or gallops  ABDOMEN: Soft, Nontender, Nondistended; Bowel sounds present  EXTREMITIES:  2+ Peripheral Pulses, No clubbing, cyanosis, or edema  PSYCH: AAOx3  NEUROLOGY: non-focal  SKIN: No rashes or lesions    LABS:                        15.8   6.11  )-----------( 141      ( 19 Apr 2025 05:59 )             45.3     04-19    139  |  107  |  17  ----------------------------<  147[H]  3.7   |  21[L]  |  1.22    Ca    8.7      19 Apr 2025 05:59    TPro  7.5  /  Alb  4.2  /  TBili  0.6  /  DBili  x   /  AST  26  /  ALT  10  /  AlkPhos  126[H]  04-18      CARDIAC MARKERS ( 18 Apr 2025 02:32 )  x     / x     / x     / x     / 2.9 ng/mL      Urinalysis Basic - ( 19 Apr 2025 05:59 )    Color: x / Appearance: x / SG: x / pH: x  Gluc: 147 mg/dL / Ketone: x  / Bili: x / Urobili: x   Blood: x / Protein: x / Nitrite: x   Leuk Esterase: x / RBC: x / WBC x   Sq Epi: x / Non Sq Epi: x / Bacteria: x        RADIOLOGY & ADDITIONAL TESTS:    Imaging Personally Reviewed:    Consultant(s) Notes Reviewed:      Care Discussed with Consultants/Other Providers:

## 2025-04-20 LAB
ANION GAP SERPL CALC-SCNC: 13 MMOL/L — SIGNIFICANT CHANGE UP (ref 5–17)
BUN SERPL-MCNC: 17 MG/DL — SIGNIFICANT CHANGE UP (ref 7–23)
CALCIUM SERPL-MCNC: 8.9 MG/DL — SIGNIFICANT CHANGE UP (ref 8.4–10.5)
CHLORIDE SERPL-SCNC: 104 MMOL/L — SIGNIFICANT CHANGE UP (ref 96–108)
CO2 SERPL-SCNC: 21 MMOL/L — LOW (ref 22–31)
CREAT SERPL-MCNC: 1.32 MG/DL — HIGH (ref 0.5–1.3)
EGFR: 59 ML/MIN/1.73M2 — LOW
EGFR: 59 ML/MIN/1.73M2 — LOW
GLUCOSE BLDC GLUCOMTR-MCNC: 132 MG/DL — HIGH (ref 70–99)
GLUCOSE BLDC GLUCOMTR-MCNC: 145 MG/DL — HIGH (ref 70–99)
GLUCOSE BLDC GLUCOMTR-MCNC: 146 MG/DL — HIGH (ref 70–99)
GLUCOSE BLDC GLUCOMTR-MCNC: 147 MG/DL — HIGH (ref 70–99)
GLUCOSE SERPL-MCNC: 128 MG/DL — HIGH (ref 70–99)
GRAM STN FLD: ABNORMAL
HCT VFR BLD CALC: 44.9 % — SIGNIFICANT CHANGE UP (ref 39–50)
HGB BLD-MCNC: 15.4 G/DL — SIGNIFICANT CHANGE UP (ref 13–17)
MCHC RBC-ENTMCNC: 30.1 PG — SIGNIFICANT CHANGE UP (ref 27–34)
MCHC RBC-ENTMCNC: 34.3 G/DL — SIGNIFICANT CHANGE UP (ref 32–36)
MCV RBC AUTO: 87.7 FL — SIGNIFICANT CHANGE UP (ref 80–100)
NRBC BLD AUTO-RTO: 0 /100 WBCS — SIGNIFICANT CHANGE UP (ref 0–0)
PLATELET # BLD AUTO: 127 K/UL — LOW (ref 150–400)
POTASSIUM SERPL-MCNC: 3.7 MMOL/L — SIGNIFICANT CHANGE UP (ref 3.5–5.3)
POTASSIUM SERPL-SCNC: 3.7 MMOL/L — SIGNIFICANT CHANGE UP (ref 3.5–5.3)
RBC # BLD: 5.12 M/UL — SIGNIFICANT CHANGE UP (ref 4.2–5.8)
RBC # FLD: 12.7 % — SIGNIFICANT CHANGE UP (ref 10.3–14.5)
SODIUM SERPL-SCNC: 138 MMOL/L — SIGNIFICANT CHANGE UP (ref 135–145)
WBC # BLD: 8.57 K/UL — SIGNIFICANT CHANGE UP (ref 3.8–10.5)
WBC # FLD AUTO: 8.57 K/UL — SIGNIFICANT CHANGE UP (ref 3.8–10.5)

## 2025-04-20 RX ORDER — ACETAMINOPHEN 500 MG/5ML
1000 LIQUID (ML) ORAL ONCE
Refills: 0 | Status: COMPLETED | OUTPATIENT
Start: 2025-04-20 | End: 2025-04-20

## 2025-04-20 RX ORDER — KETOROLAC TROMETHAMINE 30 MG/ML
15 INJECTION, SOLUTION INTRAMUSCULAR; INTRAVENOUS EVERY 8 HOURS
Refills: 0 | Status: DISCONTINUED | OUTPATIENT
Start: 2025-04-20 | End: 2025-04-21

## 2025-04-20 RX ADMIN — Medication 400 MILLIGRAM(S): at 06:52

## 2025-04-20 RX ADMIN — Medication 1000 MILLIGRAM(S): at 07:52

## 2025-04-20 RX ADMIN — CEFEPIME 100 MILLIGRAM(S): 2 INJECTION, POWDER, FOR SOLUTION INTRAVENOUS at 18:19

## 2025-04-20 RX ADMIN — POSACONAZOLE 300 MILLIGRAM(S): 100 TABLET, DELAYED RELEASE ORAL at 18:18

## 2025-04-20 RX ADMIN — CEFEPIME 100 MILLIGRAM(S): 2 INJECTION, POWDER, FOR SOLUTION INTRAVENOUS at 06:12

## 2025-04-20 RX ADMIN — Medication 5 DROP(S): at 18:19

## 2025-04-20 RX ADMIN — Medication 2 MILLIGRAM(S): at 00:12

## 2025-04-20 RX ADMIN — Medication 5 DROP(S): at 06:13

## 2025-04-20 NOTE — PROGRESS NOTE ADULT - SUBJECTIVE AND OBJECTIVE BOX
ENT ISSUE/POD: R. fungal otitis externa      HPI: 69-year-old male with PMHx HTN, HLD, prediabetes, right sided stroke presenting with right ear pain, headache x 2 days.  Patient's wife used a "wax removing stick" to clean wax out of patient's right ear on 4/15/2025.  Noticed some scant bloody discharge during the cleaning.  Later that night patient had ear pain.  Ear pain resolved by the next day but patient started experiencing headache which persisted into today. R sided, slow in onset, severe. Has tried Tylenol, Motrin, NyQuil without significant relief.  Had difficulty sleeping 2/2 HA.  Pt was found to have R. fungal otitis externa and started on acetic acid. Denies any further discharge from right ear, hearing loss, tinnitus, vertigo, fevers.          PAST MEDICAL & SURGICAL HISTORY:  HTN (hypertension)      HLD (hyperlipidemia)      Pre-existing type 1 diabetes mellitus        Allergies    No Known Allergies    Intolerances      MEDICATIONS  (STANDING):  acetic acid 2% Solution 5 Drop(s) Right Ear two times a day  cefepime   IVPB 2000 milliGRAM(s) IV Intermittent every 12 hours  cefepime   IVPB      influenza  Vaccine (HIGH DOSE) 0.5 milliLiter(s) IntraMuscular once  posaconazole DR Tablet   Oral   posaconazole DR Tablet 300 milliGRAM(s) Oral every 24 hours    MEDICATIONS  (PRN):  ketorolac   Injectable 15 milliGRAM(s) IV Push every 8 hours PRN Moderate Pain (4 - 6)  morphine  - Injectable 2 milliGRAM(s) IV Push every 6 hours PRN Severe Pain (7 - 10)      Social History: see consult    Family history: see consult    ROS:   ENT: all negative except as noted in HPI   Pulm: denies SOB, cough, hemoptysis  Neuro: denies numbness/tingling, loss of sensation  Endo: denies heat/cold intolerance, excessive sweating      Vital Signs Last 24 Hrs  T(C): 36.8 (21 Apr 2025 05:25), Max: 37.1 (20 Apr 2025 21:20)  T(F): 98.2 (21 Apr 2025 05:25), Max: 98.8 (20 Apr 2025 21:20)  HR: 71 (21 Apr 2025 05:25) (58 - 76)  BP: 144/92 (21 Apr 2025 05:25) (130/82 - 144/92)  BP(mean): --  RR: 18 (21 Apr 2025 05:25) (18 - 18)  SpO2: 97% (21 Apr 2025 05:25) (95% - 98%)    Parameters below as of 21 Apr 2025 05:25  Patient On (Oxygen Delivery Method): room air                              15.4   8.57  )-----------( 127      ( 20 Apr 2025 06:40 )             44.9    04-20    138  |  104  |  17  ----------------------------<  128[H]  3.7   |  21[L]  |  1.32[H]    Ca    8.9      20 Apr 2025 06:42         PHYSICAL EXAM:  Gen: NAD  Skin: No rashes, bruises, or lesions  Head: Normocephalic, Atraumatic  Face: no edema, erythema, or fluctuance. Parotid glands soft without mass  Eyes: no scleral injection  Ears: Right - Large amount of black fungal debris suctioned from EAC, unable to fully visualize TM. No mastoid tenderness, erythema, or ear bulging            Left - ear canal clear, TM intact without effusion or erythema. No evidence of any fluid drainage. No mastoid tenderness, erythema, or ear bulging  Nose: Nares bilaterally patent, no discharge  Mouth: No Stridor / Drooling / Trismus.  Mucosa moist, tongue/uvula midline, oropharynx clear  Neck: Flat, supple, no lymphadenopathy, trachea midline, no masses  Lymphatic: No lymphadenopathy  Resp: breathing easily, no stridor  CV: no peripheral edema/cyanosis  GI: nondistended   Peripheral vascular: no JVD or edema  Neuro: facial nerve intact, no facial droop

## 2025-04-20 NOTE — PROGRESS NOTE ADULT - SUBJECTIVE AND OBJECTIVE BOX
Patient is a 69y old  Male who presents with a chief complaint of Rt Ear pain x 3 days (19 Apr 2025 10:50)      SUBJECTIVE / OVERNIGHT EVENTS: No events         T(C): 36.7 (04-20-25 @ 11:01), Max: 36.8 (04-20-25 @ 04:22)  HR: 68 (04-20-25 @ 11:01) (68 - 75)  BP: 130/82 (04-20-25 @ 11:01) (113/70 - 133/88)  RR: 18 (04-20-25 @ 11:01) (18 - 18)  SpO2: 96% (04-20-25 @ 11:01) (95% - 98%)      MEDICATIONS  (STANDING):  acetic acid 2% Solution 5 Drop(s) Right Ear two times a day  cefepime   IVPB 2000 milliGRAM(s) IV Intermittent every 12 hours  cefepime   IVPB      influenza  Vaccine (HIGH DOSE) 0.5 milliLiter(s) IntraMuscular once  posaconazole DR Tablet   Oral   posaconazole DR Tablet 300 milliGRAM(s) Oral every 24 hours    MEDICATIONS  (PRN):  ketorolac   Injectable 15 milliGRAM(s) IV Push every 8 hours PRN Moderate Pain (4 - 6)  morphine  - Injectable 2 milliGRAM(s) IV Push every 6 hours PRN Severe Pain (7 - 10)        PHYSICAL EXAM:  GENERAL: NAD, well-developed  HEAD:  Atraumatic, Normocephalic  EYES: EOMI, PERRLA, conjunctiva and sclera clear  Rt ear Tender   NECK: Supple, No JVD  CHEST/LUNG: Clear to auscultation bilaterally; No wheeze  HEART: Regular rate and rhythm; No murmurs, rubs, or gallops  ABDOMEN: Soft, Nontender, Nondistended; Bowel sounds present  EXTREMITIES:  2+ Peripheral Pulses, No clubbing, cyanosis, or edema  PSYCH: AAOx3  NEUROLOGY: non-focal  SKIN: No rashes or lesions    LABS:                        15.8   6.11  )-----------( 141      ( 19 Apr 2025 05:59 )             45.3     04-19    139  |  107  |  17  ----------------------------<  147[H]  3.7   |  21[L]  |  1.22    Ca    8.7      19 Apr 2025 05:59    TPro  7.5  /  Alb  4.2  /  TBili  0.6  /  DBili  x   /  AST  26  /  ALT  10  /  AlkPhos  126[H]  04-18      CARDIAC MARKERS ( 18 Apr 2025 02:32 )  x     / x     / x     / x     / 2.9 ng/mL      Urinalysis Basic - ( 19 Apr 2025 05:59 )    Color: x / Appearance: x / SG: x / pH: x  Gluc: 147 mg/dL / Ketone: x  / Bili: x / Urobili: x   Blood: x / Protein: x / Nitrite: x   Leuk Esterase: x / RBC: x / WBC x   Sq Epi: x / Non Sq Epi: x / Bacteria: x        RADIOLOGY & ADDITIONAL TESTS:    Imaging Personally Reviewed:    Consultant(s) Notes Reviewed:      Care Discussed with Consultants/Other Providers:

## 2025-04-21 ENCOUNTER — TRANSCRIPTION ENCOUNTER (OUTPATIENT)
Age: 70
End: 2025-04-21

## 2025-04-21 ENCOUNTER — NON-APPOINTMENT (OUTPATIENT)
Age: 70
End: 2025-04-21

## 2025-04-21 VITALS
TEMPERATURE: 98 F | RESPIRATION RATE: 18 BRPM | SYSTOLIC BLOOD PRESSURE: 137 MMHG | OXYGEN SATURATION: 97 % | HEART RATE: 70 BPM | DIASTOLIC BLOOD PRESSURE: 89 MMHG

## 2025-04-21 LAB
ANION GAP SERPL CALC-SCNC: 12 MMOL/L — SIGNIFICANT CHANGE UP (ref 5–17)
BUN SERPL-MCNC: 19 MG/DL — SIGNIFICANT CHANGE UP (ref 7–23)
CALCIUM SERPL-MCNC: 9.2 MG/DL — SIGNIFICANT CHANGE UP (ref 8.4–10.5)
CHLORIDE SERPL-SCNC: 105 MMOL/L — SIGNIFICANT CHANGE UP (ref 96–108)
CO2 SERPL-SCNC: 21 MMOL/L — LOW (ref 22–31)
CREAT SERPL-MCNC: 1.3 MG/DL — SIGNIFICANT CHANGE UP (ref 0.5–1.3)
EGFR: 60 ML/MIN/1.73M2 — SIGNIFICANT CHANGE UP
EGFR: 60 ML/MIN/1.73M2 — SIGNIFICANT CHANGE UP
GLUCOSE SERPL-MCNC: 123 MG/DL — HIGH (ref 70–99)
HCT VFR BLD CALC: 46.7 % — SIGNIFICANT CHANGE UP (ref 39–50)
HGB BLD-MCNC: 15.9 G/DL — SIGNIFICANT CHANGE UP (ref 13–17)
MCHC RBC-ENTMCNC: 29.9 PG — SIGNIFICANT CHANGE UP (ref 27–34)
MCHC RBC-ENTMCNC: 34 G/DL — SIGNIFICANT CHANGE UP (ref 32–36)
MCV RBC AUTO: 87.9 FL — SIGNIFICANT CHANGE UP (ref 80–100)
NRBC BLD AUTO-RTO: 0 /100 WBCS — SIGNIFICANT CHANGE UP (ref 0–0)
PLATELET # BLD AUTO: 141 K/UL — LOW (ref 150–400)
POTASSIUM SERPL-MCNC: 4.2 MMOL/L — SIGNIFICANT CHANGE UP (ref 3.5–5.3)
POTASSIUM SERPL-SCNC: 4.2 MMOL/L — SIGNIFICANT CHANGE UP (ref 3.5–5.3)
RBC # BLD: 5.31 M/UL — SIGNIFICANT CHANGE UP (ref 4.2–5.8)
RBC # FLD: 12.8 % — SIGNIFICANT CHANGE UP (ref 10.3–14.5)
SODIUM SERPL-SCNC: 138 MMOL/L — SIGNIFICANT CHANGE UP (ref 135–145)
WBC # BLD: 7.93 K/UL — SIGNIFICANT CHANGE UP (ref 3.8–10.5)
WBC # FLD AUTO: 7.93 K/UL — SIGNIFICANT CHANGE UP (ref 3.8–10.5)

## 2025-04-21 PROCEDURE — 87070 CULTURE OTHR SPECIMN AEROBIC: CPT

## 2025-04-21 PROCEDURE — 36415 COLL VENOUS BLD VENIPUNCTURE: CPT

## 2025-04-21 PROCEDURE — 93005 ELECTROCARDIOGRAM TRACING: CPT

## 2025-04-21 PROCEDURE — G0378: CPT

## 2025-04-21 PROCEDURE — 80048 BASIC METABOLIC PNL TOTAL CA: CPT

## 2025-04-21 PROCEDURE — 71045 X-RAY EXAM CHEST 1 VIEW: CPT

## 2025-04-21 PROCEDURE — 85025 COMPLETE CBC W/AUTO DIFF WBC: CPT

## 2025-04-21 PROCEDURE — 82553 CREATINE MB FRACTION: CPT

## 2025-04-21 PROCEDURE — 70450 CT HEAD/BRAIN W/O DYE: CPT | Mod: MC

## 2025-04-21 PROCEDURE — 99231 SBSQ HOSP IP/OBS SF/LOW 25: CPT

## 2025-04-21 PROCEDURE — G0545: CPT

## 2025-04-21 PROCEDURE — 80053 COMPREHEN METABOLIC PANEL: CPT

## 2025-04-21 PROCEDURE — 96375 TX/PRO/DX INJ NEW DRUG ADDON: CPT

## 2025-04-21 PROCEDURE — 84484 ASSAY OF TROPONIN QUANT: CPT

## 2025-04-21 PROCEDURE — 99285 EMERGENCY DEPT VISIT HI MDM: CPT

## 2025-04-21 PROCEDURE — 70481 CT ORBIT/EAR/FOSSA W/DYE: CPT | Mod: MC

## 2025-04-21 PROCEDURE — 87077 CULTURE AEROBIC IDENTIFY: CPT

## 2025-04-21 PROCEDURE — 99233 SBSQ HOSP IP/OBS HIGH 50: CPT

## 2025-04-21 PROCEDURE — 84443 ASSAY THYROID STIM HORMONE: CPT

## 2025-04-21 PROCEDURE — 87102 FUNGUS ISOLATION CULTURE: CPT

## 2025-04-21 PROCEDURE — 87205 SMEAR GRAM STAIN: CPT

## 2025-04-21 PROCEDURE — 96374 THER/PROPH/DIAG INJ IV PUSH: CPT

## 2025-04-21 PROCEDURE — 82962 GLUCOSE BLOOD TEST: CPT

## 2025-04-21 PROCEDURE — 87075 CULTR BACTERIA EXCEPT BLOOD: CPT

## 2025-04-21 PROCEDURE — 85027 COMPLETE CBC AUTOMATED: CPT

## 2025-04-21 PROCEDURE — 99232 SBSQ HOSP IP/OBS MODERATE 35: CPT

## 2025-04-21 RX ORDER — ACETIC ACID 2 %
5 SOLUTION, NON-ORAL OTIC (EAR)
Qty: 1 | Refills: 0
Start: 2025-04-21 | End: 2025-05-01

## 2025-04-21 RX ADMIN — CEFEPIME 100 MILLIGRAM(S): 2 INJECTION, POWDER, FOR SOLUTION INTRAVENOUS at 05:48

## 2025-04-21 RX ADMIN — Medication 5 DROP(S): at 05:48

## 2025-04-21 NOTE — PROGRESS NOTE ADULT - SUBJECTIVE AND OBJECTIVE BOX
Follow Up:  otitis externa    Interval History/ROS: pt is doing well, afebrile, resolved ear and head pain        Allergies  No Known Allergies        ANTIMICROBIALS:  cefepime   IVPB 2000 every 12 hours  cefepime   IVPB    posaconazole DR Tablet    posaconazole DR Tablet 300 every 24 hours      OTHER MEDS:  acetic acid 2% Solution 5 Drop(s) Right Ear two times a day  influenza  Vaccine (HIGH DOSE) 0.5 milliLiter(s) IntraMuscular once  ketorolac   Injectable 15 milliGRAM(s) IV Push every 8 hours PRN  morphine  - Injectable 2 milliGRAM(s) IV Push every 6 hours PRN      Vital Signs Last 24 Hrs  T(C): 36.8 (21 Apr 2025 05:25), Max: 37.1 (20 Apr 2025 21:20)  T(F): 98.2 (21 Apr 2025 05:25), Max: 98.8 (20 Apr 2025 21:20)  HR: 71 (21 Apr 2025 05:25) (58 - 76)  BP: 144/92 (21 Apr 2025 05:25) (130/86 - 144/92)  BP(mean): --  RR: 18 (21 Apr 2025 05:25) (18 - 18)  SpO2: 97% (21 Apr 2025 05:25) (97% - 98%)    Parameters below as of 21 Apr 2025 05:25  Patient On (Oxygen Delivery Method): room air        Physical Exam:  General:    NAD,  sitting on a chair  ENT:   no ear tenderness or drainage  Respiratory:  comfortable on RA  :    no  martinez  Musculoskeletal:   no joint swelling  vascular: no phlebitis  Skin:    no rash                          15.9   7.93  )-----------( 141      ( 21 Apr 2025 06:14 )             46.7       04-21    138  |  105  |  19  ----------------------------<  123[H]  4.2   |  21[L]  |  1.30    Ca    9.2      21 Apr 2025 06:13        Urinalysis Basic - ( 21 Apr 2025 06:13 )    Color: x / Appearance: x / SG: x / pH: x  Gluc: 123 mg/dL / Ketone: x  / Bili: x / Urobili: x   Blood: x / Protein: x / Nitrite: x   Leuk Esterase: x / RBC: x / WBC x   Sq Epi: x / Non Sq Epi: x / Bacteria: x        MICROBIOLOGY:  v  Other Other  04-18-25   Culture is being performed. Fungal cultures are held for 4 weeks.  --    No polymorphonuclear cells seen  No organisms seen  by cytocentrifuge                RADIOLOGY:  Images independently visualized and reviewed personally, findings as below  < from: CT Internal Auditory Canals w/ IV Cont (04.18.25 @ 18:36) >  IMPRESSION:  Small amount of fluid versus cerumen partially opacifies the medial   aspect of the right external auditory canal and obscures the tympanic   membrane.  There is soft tissue thickening more laterally in the external   auditory canal.   The findings may be posttraumatic.  Infectious otitis   externa should be excluded clinically.    There is a trace right mastoid effusion.  There is mild partial   opacification in the mesotympanum of the right middle ear cavity, lateral   to the ossicular chain, without bony erosive changes.  Correlate   clinically for sterile effusion versus acute otitis media.    There are no periauricular soft tissue inflammatory changes.  There is no   abscess or drainable fluid collection.  There is no evidence for   intracranial spread of infection, within limits of CT technique.    Patchy paranasal sinus disease as described above.    Mildly enlarged adenoids.    < end of copied text >

## 2025-04-21 NOTE — DISCHARGE NOTE PROVIDER - NSDCMRMEDTOKEN_GEN_ALL_CORE_FT
acetic acid 2% otic solution: 5 drop(s) to each affected ear 2 times a day complete on 5/2/2025  amLODIPine 5 mg oral tablet: 1 tab(s) orally once a day  aspirin 81 mg oral delayed release tablet: 1 tab(s) orally once a day  atorvastatin 20 mg oral tablet: 1 tab(s) orally once a day (at bedtime)  clopidogrel 75 mg oral tablet: 1 tab(s) orally once a day  folic acid 1 mg oral tablet: 1 tab(s) orally once a day  metFORMIN 500 mg oral tablet, extended release: 1 tab(s) orally once a day  tenofovir disoproxil fumarate 300 mg oral tablet: 1 tab(s) orally once a day  valsartan 160 mg oral tablet: 1 tab(s) orally once a day

## 2025-04-21 NOTE — PROGRESS NOTE ADULT - SUBJECTIVE AND OBJECTIVE BOX
ENT ISSUE/POD: R. fungal otitis externa    HPI: 69-year-old male with PMHx HTN, HLD, prediabetes, right sided stroke presenting with right ear pain, headache x 2 days found to have R fungal OE, cultured bacterial and fungal, started on acetic acid ear drops. Large amount of fungal debris was suctioned from R ear on 4/20.     Denies fever, N/V, tinnitus, dizziness, ear pain, otorrhea, congestion, recent URI, hearing loss, hx of sx, trauma or recent travel.       PAST MEDICAL & SURGICAL HISTORY:  HTN (hypertension)      HLD (hyperlipidemia)      Pre-existing type 1 diabetes mellitus        Allergies    No Known Allergies    Intolerances      MEDICATIONS  (STANDING):  acetic acid 2% Solution 5 Drop(s) Right Ear two times a day  cefepime   IVPB 2000 milliGRAM(s) IV Intermittent every 12 hours  cefepime   IVPB      influenza  Vaccine (HIGH DOSE) 0.5 milliLiter(s) IntraMuscular once  posaconazole DR Tablet   Oral   posaconazole DR Tablet 300 milliGRAM(s) Oral every 24 hours    MEDICATIONS  (PRN):  ketorolac   Injectable 15 milliGRAM(s) IV Push every 8 hours PRN Moderate Pain (4 - 6)  morphine  - Injectable 2 milliGRAM(s) IV Push every 6 hours PRN Severe Pain (7 - 10)        Social History: see consult    Family history: see consult      ROS:   ENT: all negative except as noted in HPI   Pulm: denies SOB, cough, hemoptysis  Neuro: denies numbness/tingling, loss of sensation  Endo: denies heat/cold intolerance, excessive sweating      Vital Signs Last 24 Hrs  T(C): 36.8 (21 Apr 2025 05:25), Max: 37.1 (20 Apr 2025 21:20)  T(F): 98.2 (21 Apr 2025 05:25), Max: 98.8 (20 Apr 2025 21:20)  HR: 71 (21 Apr 2025 05:25) (58 - 76)  BP: 144/92 (21 Apr 2025 05:25) (130/82 - 144/92)  BP(mean): --  RR: 18 (21 Apr 2025 05:25) (18 - 18)  SpO2: 97% (21 Apr 2025 05:25) (96% - 98%)    Parameters below as of 21 Apr 2025 05:25  Patient On (Oxygen Delivery Method): room air                              15.9   7.93  )-----------( 141      ( 21 Apr 2025 06:14 )             46.7    04-21    138  |  105  |  19  ----------------------------<  123[H]  4.2   |  21[L]  |  1.30    Ca    9.2      21 Apr 2025 06:13           PHYSICAL EXAM:  Gen: NAD  Skin: No rashes, bruises, or lesions  Head: Normocephalic, Atraumatic  Face: no edema, erythema, or fluctuance. Parotid glands soft without mass  Eyes: no scleral injection  Ears: Right - ear canal clear, TM intact without effusion or erythema. No evidence of any fluid drainage. No mastoid tenderness, erythema, or ear bulging            Left - ear canal clear, TM intact without effusion or erythema. No evidence of any fluid drainage. No mastoid tenderness, erythema, or ear bulging  Nose: Nares bilaterally patent, no discharge  Mouth: No Stridor / Drooling / Trismus.  Mucosa moist, tongue/uvula midline, oropharynx clear  Neck: Flat, supple, no lymphadenopathy, trachea midline, no masses  Lymphatic: No lymphadenopathy  Resp: breathing easily, no stridor  Neuro: no facial droop         ENT ISSUE/POD: R. fungal otitis externa    HPI: 69-year-old male with PMHx HTN, HLD, prediabetes, right sided stroke initially presenting with right ear pain, headache x 2 days, found to have R fungal OE, cultured bacterial and fungal, started on acetic acid ear drops. Large amount of fungal debris was suctioned from R ear on 4/20. Pt seen and examined at bedside. Pt stated that HA and right ear pain are now resolved. Denies any further discharge from right ear, ear pain, hearing loss, tinnitus, vertigo, fevers.              PAST MEDICAL & SURGICAL HISTORY:  HTN (hypertension)      HLD (hyperlipidemia)      Pre-existing type 1 diabetes mellitus        Allergies    No Known Allergies    Intolerances      MEDICATIONS  (STANDING):  acetic acid 2% Solution 5 Drop(s) Right Ear two times a day  cefepime   IVPB 2000 milliGRAM(s) IV Intermittent every 12 hours  cefepime   IVPB      influenza  Vaccine (HIGH DOSE) 0.5 milliLiter(s) IntraMuscular once  posaconazole DR Tablet   Oral   posaconazole DR Tablet 300 milliGRAM(s) Oral every 24 hours    MEDICATIONS  (PRN):  ketorolac   Injectable 15 milliGRAM(s) IV Push every 8 hours PRN Moderate Pain (4 - 6)  morphine  - Injectable 2 milliGRAM(s) IV Push every 6 hours PRN Severe Pain (7 - 10)        Social History: see consult    Family history: see consult      ROS:   ENT: all negative except as noted in HPI   Pulm: denies SOB, cough, hemoptysis  Neuro: denies numbness/tingling, loss of sensation  Endo: denies heat/cold intolerance, excessive sweating      Vital Signs Last 24 Hrs  T(C): 36.8 (21 Apr 2025 05:25), Max: 37.1 (20 Apr 2025 21:20)  T(F): 98.2 (21 Apr 2025 05:25), Max: 98.8 (20 Apr 2025 21:20)  HR: 71 (21 Apr 2025 05:25) (58 - 76)  BP: 144/92 (21 Apr 2025 05:25) (130/82 - 144/92)  BP(mean): --  RR: 18 (21 Apr 2025 05:25) (18 - 18)  SpO2: 97% (21 Apr 2025 05:25) (96% - 98%)    Parameters below as of 21 Apr 2025 05:25  Patient On (Oxygen Delivery Method): room air                              15.9   7.93  )-----------( 141      ( 21 Apr 2025 06:14 )             46.7    04-21    138  |  105  |  19  ----------------------------<  123[H]  4.2   |  21[L]  |  1.30    Ca    9.2      21 Apr 2025 06:13           PHYSICAL EXAM:  Gen: NAD  Skin: No rashes, bruises, or lesions  Head: Normocephalic, Atraumatic  Face: no edema, erythema, or fluctuance. Parotid glands soft without mass  Eyes: no scleral injection  Ears: Right - Minimal amounts of white fungal debris suctioned from EAC, unable to fully visualize TM. No mastoid tenderness, erythema, or ear bulging. No tragal tenderness, no pain with pulling on pinna.             Left - ear canal clear, TM intact without effusion or erythema. No evidence of any fluid drainage. No mastoid tenderness, erythema, or ear bulging. No tragal tenderness, no pain with pulling on pinna.   Nose: Nares bilaterally patent, no discharge  Mouth: No Stridor / Drooling / Trismus.  Mucosa moist, tongue/uvula midline, oropharynx clear  Neck: Flat, supple, no lymphadenopathy, trachea midline, no masses  Lymphatic: No lymphadenopathy  Resp: breathing easily, no stridor  Neuro: facial nerve intact, no facial droop

## 2025-04-21 NOTE — DISCHARGE NOTE PROVIDER - HOSPITAL COURSE
Hospital Course:    69m with HTN, HLD, CVA, has had ear itching and foreign body sensation for a while and has been using Q-tips, 2-3 days ago, his wife used a wax cleaning stick and noticed some scant bloody discharge during the cleaning, after that he started to have R ear and head pain, no fever, chills, neck pain, vomiting  here afebrile, no WBC  head CT: No acute intracranial hemorrhage, mass effect, or midline shift. Mild soft tissue thickening along the right external auditory canal, question otitis externa, correlate clinically.  On ENT exam, wick removed from right EAC, ear canal w/ white and black debris which was suctioned and fungal / bacterial cultures obtained, unable to fully visualize TM. Pt with fungal otitis externa. Pt is not immunocompromised though chronic hep B on tenofovir. S/p cefepime 2 q 12 and posaconazole 300 bid. ID recs to d/c abx and Posaconazole on discharge and c/w Acetic acid ear drops, 5 drops to right ear BID x14 days as per ENT.    Medically cleared for discharge home per Dr. Hendricks.    Important Medication Changes and Reason: see med rec    Active or Pending Issues Requiring Follow-up: f/u ENT, PCP    Advanced Directives:   [X] Full code  [ ] DNR  [ ] Hospice    Discharge Diagnoses:  Bradycardia  Fungal otitis externa  Headache

## 2025-04-21 NOTE — DISCHARGE NOTE NURSING/CASE MANAGEMENT/SOCIAL WORK - CAREGIVER NAME
Pt has no known allergy to any latex or rubber products   Pts Tobacco history verified 1/6/2017  Medication Reviewed 1/6/2017       Health Maintenance Summary     Topic Due On Due Status Completed On    Colorectal Cancer Screening - Colonoscopy  Apr 17, 2017 Not Due Apr 17, 2014    Immunization - Td/Tdap Apr 4, 2024 Not Due Apr 4, 2014    Immunization-Zoster  Completed Apr 4, 2014    Immunization - Pneumococcal Jan 5, 2014 Overdue Jan 5, 2013    Abdominal Aortic Aneurysm (AAA) Screening  Dec 22, 2010 Overdue     Medicare Wellness Visit Dec 22, 2010 Overdue     Immunization-Influenza Sep 1, 2016 Overdue Dec 20, 2012          Patient is due for topics as listed above, he wishes to discuss with provider .       aspen jean

## 2025-04-21 NOTE — DISCHARGE NOTE PROVIDER - NSDCCPCAREPLAN_GEN_ALL_CORE_FT
PRINCIPAL DISCHARGE DIAGNOSIS  Diagnosis: Bradycardia  Assessment and Plan of Treatment: Heart rate stable on telemetry monitor      SECONDARY DISCHARGE DIAGNOSES  Diagnosis: Otitis externa of right ear  Assessment and Plan of Treatment: You were seen by ENT and ID  You required IV antibiotic and antifungal. Now you can continue ear drops as directed.  Please follow up with Dr. Nuñez upon discharge.  Follow up with primary care doctor within 1 week.

## 2025-04-21 NOTE — PROGRESS NOTE ADULT - ASSESSMENT
69-year-old male with pmhx htn, hld,  prediabetes, right sided stroke presenting with right ear pain, headache x 2 days      CT Head shows  No acute intracranial hemorrhage, mass effect, or midline shift. Mild soft tissue thickening along the right external auditory canal, question otitis externa,     Rt Ear Pain Right - wick removed, ear canal w/ white and black debris which was suctioned, fungal and bacterial cultures obtained, unable to fully visualize TM. No mastoid tenderness, erythema, or ear bulging      Fungal Otits Externa   Rt ear fluid/ abscess cx      ID cosnulted    ENT consult following   * switch cipro to cefepime 2 q 12  * start posaconazole 300 bid for 2 doses then qd for now as per ID       HTN   HLD     Home meds     Pre Diabetes     HISS 
69-year-old male with pmhx htn, hld,  prediabetes, right sided stroke presenting with right ear pain, headache x 2 days      CT Head shows  No acute intracranial hemorrhage, mass effect, or midline shift. Mild soft tissue thickening along the right external auditory canal, question otitis externa,     Rt Ear Pain Right - wick removed, ear canal w/ white and black debris which was suctioned, fungal and bacterial cultures obtained, unable to fully visualize TM. No mastoid tenderness, erythema, or ear bulging    ID cosnulted    ENT consult following   * switch cipro to cefepime 2 q 12  * start posaconazole 300 bid for 2 doses then qd for now as per ID       HTN   HLD     Home meds     Pre Diabetes     HISS 
69m with HTN, HLD, CVA, has had ear itching and foreign body sensation for a while and has been using Q-tips, 2-3 days ago, his wife used a wax cleaning stick and noticed some scant bloody discharge during the cleaning, after that he started to have R ear and head pain, no fever, chills, neck pain, vomiting  here afebrile, no WBC  head CT: No acute intracranial hemorrhage, mass effect, or midline shift. Mild soft tissue thickening along the right external auditory canal, question otitis externa, correlate clinically.  On ENT exam, wick removed from right EAC, ear canal w/ white and black debris which was suctioned and fungal / bacterial cultures obtained, unable to fully visualize TM.    otitis externa, on ENT exam had ear canal w/ white and black debris which was suctioned and ENT though it is fungal otitis externa, pt is not immunocompromised though, ear cx with mold  chronic hep B on tenofovir    * I discussed with ENT and pt does not need systemic antifungals and just topical antifungal is adequate   * s/p 3-4 days of cefepime and posaconazole but will stop now and continue with topical drops as per ENT  * follow with ENT   The above assessment and plan was discussed with the primary team    Manuela Delaney MD  contact on teams  After 5pm and on weekends call [phone]  
69-year-old male with PMHx HTN, HLD, prediabetes, right sided stroke presenting with right ear pain, headache x 2 days.  Patient's wife used a "wax removing stick" to clean wax out of patient's right ear on 4/15/2025.  Noticed some scant bloody discharge during the cleaning.  Later that night patient had ear pain.  Ear pain resolved by the next day but patient started experiencing headache which persisted into today. R sided, slow in onset, severe. Has tried Tylenol, Motrin, NyQuil without significant relief.  Had difficulty sleeping 2/2 HA.  Pt was found to have R. fungal otitis externa and started on acetic acid. 
69-year-old male with PMHx HTN, HLD, prediabetes, right sided stroke presenting with right ear pain, headache x 2 days.  Patient's wife used a "wax removing stick" to clean wax out of patient's right ear on 4/15/2025.  Noticed some scant bloody discharge during the cleaning.  Later that night patient had ear pain.  Ear pain resolved by the next day but patient started experiencing headache which persisted into today. R sided, slow in onset, severe. Has tried Tylenol, Motrin, NyQuil without significant relief.  Had difficulty sleeping 2/2 HA.  Pt was found to have R. fungal otitis externa and started on acetic acid. Large amount of fungal debris removed from R. EAC.    
69-year-old male with PMHx HTN, HLD, prediabetes, right sided stroke initially presenting with right ear pain, headache x 2 days, found to have R fungal OE, cultured bacterial and fungal, started on acetic acid ear drops. Large amount of fungal debris was suctioned from R ear on 4/20. On exam, minimal amounts of white fungal debris suctioned from right EAC, unable to fully visualize right TM. WBC 7.93. Afebrile. Bacterial culture growing few mold like fungus and commensal cody c/w body site. Fungal culture pending.

## 2025-04-21 NOTE — DISCHARGE NOTE NURSING/CASE MANAGEMENT/SOCIAL WORK - FINANCIAL ASSISTANCE
[facility] provides services at a reduced cost to those who are determined to be eligible through [facility]’s financial assistance program. Information regarding [facility]’s financial assistance program can be found by going to [email or web address] or by calling [phone].

## 2025-04-21 NOTE — PROGRESS NOTE ADULT - REASON FOR ADMISSION
Rt Ear pain x 3 days

## 2025-04-21 NOTE — PROGRESS NOTE ADULT - PROBLEM SELECTOR PLAN 1
- continue Acetic acid ear drops, 5 drops to right ear BID x14 days   - f/u fungal and bacterial ear cultures  - Follow up outpatient ENT Dr. Nuñez, Dr. Mcgee, Dr. Kirkland, Dr. Lerner. Call [phone].
- Recommend Acetic acid ear drops, 5 drops to right ear BID x14 days   - F/u fungal and bacterial cultures  - Patient should follow up in ENT office as an outpatient. May see Dr. Nuñez, Dr. Mcgee, Dr. Kirkland, Dr. Lerner. Call [phone].
Recommend Acetic acid ear drops, 5 drops to right ear BID x14 days   - F/u fungal and bacterial cultures  - Patient should follow up in ENT office as an outpatient. May see Dr. Nuñez, Dr. Mcgee, Dr. Kirkland, Dr. Lerner. Call [phone].

## 2025-04-21 NOTE — DISCHARGE NOTE NURSING/CASE MANAGEMENT/SOCIAL WORK - PATIENT PORTAL LINK FT
You can access the FollowMyHealth Patient Portal offered by [facility] by registering at the following website: [email or web address]. By joining [facility]’s FollowMyHealth portal, you will also be able to view your health information using other applications (apps) compatible with our system.

## 2025-04-21 NOTE — DISCHARGE NOTE PROVIDER - CARE PROVIDER_API CALL
Erika Nuñez  Otolaryngology  [address] [place] [address], [address]  [place] [address]  Phone: [phone]  Fax: [phone]  Follow Up Time: 1 week    Nile Mckeon  [address]  [place] [address]  Phone: [phone]  Fax: [phone]  Follow Up Time:

## 2025-04-29 LAB
CULTURE RESULTS: ABNORMAL
SPECIMEN SOURCE: SIGNIFICANT CHANGE UP

## 2025-05-07 PROBLEM — E10.9 TYPE 1 DIABETES MELLITUS WITHOUT COMPLICATIONS: Chronic | Status: ACTIVE | Noted: 2025-04-18

## 2025-05-07 PROBLEM — E78.5 HYPERLIPIDEMIA, UNSPECIFIED: Chronic | Status: ACTIVE | Noted: 2025-04-18

## 2025-05-07 PROBLEM — I10 ESSENTIAL (PRIMARY) HYPERTENSION: Chronic | Status: ACTIVE | Noted: 2025-04-18

## 2025-05-17 LAB
CULTURE RESULTS: ABNORMAL
SPECIMEN SOURCE: SIGNIFICANT CHANGE UP

## 2025-05-20 ENCOUNTER — NON-APPOINTMENT (OUTPATIENT)
Age: 70
End: 2025-05-20

## 2025-06-10 ENCOUNTER — APPOINTMENT (OUTPATIENT)
Dept: OTOLARYNGOLOGY | Facility: CLINIC | Age: 70
End: 2025-06-10
Payer: MEDICARE

## 2025-06-10 ENCOUNTER — NON-APPOINTMENT (OUTPATIENT)
Age: 70
End: 2025-06-10

## 2025-06-10 VITALS
SYSTOLIC BLOOD PRESSURE: 150 MMHG | BODY MASS INDEX: 28.04 KG/M2 | WEIGHT: 185 LBS | HEART RATE: 56 BPM | DIASTOLIC BLOOD PRESSURE: 93 MMHG | HEIGHT: 68 IN

## 2025-06-10 PROCEDURE — 99212 OFFICE O/P EST SF 10 MIN: CPT

## 2025-06-10 RX ORDER — TENOFOVIR DISOPROXIL FUMARATE 300 MG/1
TABLET ORAL
Refills: 0 | Status: ACTIVE | COMMUNITY

## 2025-06-10 RX ORDER — HYDROCORTISONE AND ACETIC ACID OTIC 20.75; 10.375 MG/ML; MG/ML
1-2 SOLUTION AURICULAR (OTIC) TWICE DAILY
Qty: 1 | Refills: 2 | Status: ACTIVE | COMMUNITY
Start: 2025-06-10 | End: 1900-01-01